# Patient Record
Sex: MALE | Employment: UNEMPLOYED | URBAN - METROPOLITAN AREA
[De-identification: names, ages, dates, MRNs, and addresses within clinical notes are randomized per-mention and may not be internally consistent; named-entity substitution may affect disease eponyms.]

---

## 2021-11-15 ENCOUNTER — CLINICAL SUPPORT (OUTPATIENT)
Dept: REHABILITATION | Facility: HOSPITAL | Age: 46
End: 2021-11-15
Payer: MEDICAID

## 2021-11-15 DIAGNOSIS — M25.661 DECREASED RANGE OF MOTION OF RIGHT KNEE: ICD-10-CM

## 2021-11-15 PROCEDURE — 97161 PT EVAL LOW COMPLEX 20 MIN: CPT | Mod: PO

## 2021-11-19 ENCOUNTER — CLINICAL SUPPORT (OUTPATIENT)
Dept: REHABILITATION | Facility: HOSPITAL | Age: 46
End: 2021-11-19
Payer: MEDICAID

## 2021-11-19 DIAGNOSIS — M25.661 DECREASED RANGE OF MOTION OF RIGHT KNEE: ICD-10-CM

## 2021-11-19 PROCEDURE — 97110 THERAPEUTIC EXERCISES: CPT | Mod: PO,CQ

## 2021-11-22 ENCOUNTER — CLINICAL SUPPORT (OUTPATIENT)
Dept: REHABILITATION | Facility: HOSPITAL | Age: 46
End: 2021-11-22
Payer: MEDICAID

## 2021-11-22 DIAGNOSIS — M25.661 DECREASED RANGE OF MOTION OF RIGHT KNEE: ICD-10-CM

## 2021-11-22 PROCEDURE — 97110 THERAPEUTIC EXERCISES: CPT | Mod: PO,CQ

## 2021-11-26 ENCOUNTER — CLINICAL SUPPORT (OUTPATIENT)
Dept: REHABILITATION | Facility: HOSPITAL | Age: 46
End: 2021-11-26
Payer: MEDICAID

## 2021-11-26 DIAGNOSIS — M25.661 DECREASED RANGE OF MOTION OF RIGHT KNEE: ICD-10-CM

## 2021-11-26 PROCEDURE — 97110 THERAPEUTIC EXERCISES: CPT | Mod: PO,CQ

## 2021-11-26 PROCEDURE — 97112 NEUROMUSCULAR REEDUCATION: CPT | Mod: PO,CQ

## 2021-11-30 ENCOUNTER — CLINICAL SUPPORT (OUTPATIENT)
Dept: REHABILITATION | Facility: HOSPITAL | Age: 46
End: 2021-11-30
Payer: MEDICAID

## 2021-11-30 DIAGNOSIS — M25.661 DECREASED RANGE OF MOTION OF RIGHT KNEE: ICD-10-CM

## 2021-11-30 PROCEDURE — 97110 THERAPEUTIC EXERCISES: CPT | Mod: PO

## 2021-12-03 ENCOUNTER — CLINICAL SUPPORT (OUTPATIENT)
Dept: REHABILITATION | Facility: HOSPITAL | Age: 46
End: 2021-12-03
Payer: MEDICAID

## 2021-12-03 DIAGNOSIS — M25.661 DECREASED RANGE OF MOTION OF RIGHT KNEE: ICD-10-CM

## 2021-12-03 PROCEDURE — 97110 THERAPEUTIC EXERCISES: CPT | Mod: PO

## 2021-12-07 ENCOUNTER — CLINICAL SUPPORT (OUTPATIENT)
Dept: REHABILITATION | Facility: HOSPITAL | Age: 46
End: 2021-12-07
Payer: MEDICAID

## 2021-12-07 DIAGNOSIS — M25.661 DECREASED RANGE OF MOTION OF RIGHT KNEE: ICD-10-CM

## 2021-12-07 PROCEDURE — 97110 THERAPEUTIC EXERCISES: CPT | Mod: PO,CQ

## 2021-12-10 ENCOUNTER — CLINICAL SUPPORT (OUTPATIENT)
Dept: REHABILITATION | Facility: HOSPITAL | Age: 46
End: 2021-12-10
Payer: MEDICAID

## 2021-12-10 DIAGNOSIS — M25.661 DECREASED RANGE OF MOTION OF RIGHT KNEE: ICD-10-CM

## 2021-12-10 PROCEDURE — 97110 THERAPEUTIC EXERCISES: CPT | Mod: PO

## 2021-12-14 ENCOUNTER — CLINICAL SUPPORT (OUTPATIENT)
Dept: REHABILITATION | Facility: HOSPITAL | Age: 46
End: 2021-12-14
Payer: MEDICAID

## 2021-12-14 DIAGNOSIS — M25.661 DECREASED RANGE OF MOTION OF RIGHT KNEE: ICD-10-CM

## 2021-12-14 PROCEDURE — 97110 THERAPEUTIC EXERCISES: CPT | Mod: PO

## 2021-12-16 ENCOUNTER — DOCUMENTATION ONLY (OUTPATIENT)
Dept: REHABILITATION | Facility: HOSPITAL | Age: 46
End: 2021-12-16
Payer: MEDICAID

## 2021-12-17 ENCOUNTER — CLINICAL SUPPORT (OUTPATIENT)
Dept: REHABILITATION | Facility: HOSPITAL | Age: 46
End: 2021-12-17
Payer: MEDICAID

## 2021-12-17 DIAGNOSIS — M25.661 DECREASED RANGE OF MOTION OF RIGHT KNEE: ICD-10-CM

## 2021-12-17 PROCEDURE — 97110 THERAPEUTIC EXERCISES: CPT | Mod: PO,CQ

## 2021-12-21 ENCOUNTER — CLINICAL SUPPORT (OUTPATIENT)
Dept: REHABILITATION | Facility: HOSPITAL | Age: 46
End: 2021-12-21
Payer: MEDICAID

## 2021-12-21 DIAGNOSIS — M25.661 DECREASED RANGE OF MOTION OF RIGHT KNEE: ICD-10-CM

## 2021-12-21 PROCEDURE — 97110 THERAPEUTIC EXERCISES: CPT | Mod: PO

## 2021-12-23 ENCOUNTER — CLINICAL SUPPORT (OUTPATIENT)
Dept: REHABILITATION | Facility: HOSPITAL | Age: 46
End: 2021-12-23
Payer: MEDICAID

## 2021-12-23 DIAGNOSIS — M25.661 DECREASED RANGE OF MOTION OF RIGHT KNEE: ICD-10-CM

## 2021-12-23 PROCEDURE — 97110 THERAPEUTIC EXERCISES: CPT | Mod: PO

## 2021-12-28 ENCOUNTER — CLINICAL SUPPORT (OUTPATIENT)
Dept: REHABILITATION | Facility: HOSPITAL | Age: 46
End: 2021-12-28
Payer: MEDICAID

## 2021-12-28 DIAGNOSIS — M25.661 DECREASED RANGE OF MOTION OF RIGHT KNEE: ICD-10-CM

## 2021-12-28 PROCEDURE — 97110 THERAPEUTIC EXERCISES: CPT | Mod: PO

## 2022-01-03 ENCOUNTER — CLINICAL SUPPORT (OUTPATIENT)
Dept: REHABILITATION | Facility: HOSPITAL | Age: 47
End: 2022-01-03
Payer: MEDICAID

## 2022-01-03 DIAGNOSIS — M25.661 DECREASED RANGE OF MOTION OF RIGHT KNEE: ICD-10-CM

## 2022-01-03 PROCEDURE — 97110 THERAPEUTIC EXERCISES: CPT | Mod: PO

## 2022-01-03 NOTE — PROGRESS NOTES
"[facility]   Physical Therapy Treatment Note    Name: Tomás Ventura  Clinic Number: [id number]    Therapy Diagnosis:   Encounter Diagnosis   Name Primary?    Decreased range of motion of right knee      Physician: Gregory Fitzpatrick MD     Visit Date: 1/3/2022    Physician Orders: PT Eval and Treat   Medical Diagnosis from Referral: M17.11 (ICD-10-CM) - Osteoarthritis of right knee  Evaluation Date: 11/15/2021  Authorization Period Expiration: 02/18/2022  Plan of Care Expiration: 2/15/2022  Visit # / Visits authorized: 1/8     Time In: 112pm  Time Out: 208pm  Total Billable Time:  56 minutes  (medicaid)    Precautions: Standard and s/p R TKA on 11/11/2021    SUBJECTIVE     Pt reports: that he does feel like he is improving a little each day but he continues to notice a lot of stiffness. He is starting to walk about a mile per day and he is trying to "walk normal" again.   He was compliant with home exercise program.  Response to previous treatment: no adverse effects   Functional change: ongoing  Pain: 4/10   Location: Right knee        OBJECTIVE     No Objective measurements taken today    Treatment     Tomás received the treatments listed below:      Tomás received therapeutic exercises to develop strength, endurance and ROM for 41 minutes including:    +Upright Bike, 5 minutes, Seat 12, inability to complete full revolutions  +Treadmill Ambulation, backwards, 5 minutes, 0.6 mph  Standing TKEs with BTB, 3 x 10 5" holds  HS stretch at the edge of the mat, 4 x 30"  SLRs with 2#, 3 x 8   Prone Extension Stretch with 5# weight x 8 min  Heel slides with strap for overpressure, 2 x 10  Bridges, 3 x 8  Side lying hip abduction, 3 x 8 on R  Shuttle Leg press, bilateral 3.5  ropes, 4 minutes  Shuttle Leg press, unilateral 2 ropes, 2 minutes  Standing Eccentric Heel Raises 2 x 10 R 3# weight   Knee flexion stretch on theraball 3 x 10       Not today:  Ankle pumps with feet elevated, 2 x " "10  Bridges, 3 x 10 3" holds  Upright bike x 6 min half-revolutions   Heel prop extension stretch with towel under the ankle, 5' (2 trials), two 3# weights (above and below knee) on second trial  Seated hamstring stretch 3x30"  gastroc stretch with strap 3x30"       manual therapy techniques: Soft tissue Mobilization were applied to the: right knee for 15 minutes, including:    Inferior Patellar Mobilizations + Knee Flexion  Superior Patellar Mobilizations + Knee Extension  MFR to Hamstrings with Skin-Rolling + Muscle Bending  Anterior Tibial Glides Grade IV  Tibial E.R Mobilizations, 3x30  Posterior tibial glides with IR, grade IV,  PROM into extension    Gait training to improve functional mobility and safety for 0 minutes, including:    Patient Education and Home Exercises     Home Exercises Provided and Patient Education Provided     Education provided:   - HEP  - proper heel to toe gait     Written Home Exercises Provided: Patient instructed to cont prior HEP. Exercises were reviewed and Tomás was able to demonstrate them prior to the end of the session.  Tomás demonstrated good  understanding of the education provided. See EMR under Patient Instructions for exercises provided during therapy sessions    ASSESSMENT     Pt continues to present to PT with a bent knee. He is able to make an in session change with his knee extension to lacking about 4 degrees but this does not last to the next session. His posterior capsule stiffness seems to be the main contributor this his lack of knee extension. Functionally he is beginning to flex and extend his knee while he ambulates and he is working on this at home by walking around his neighborhood. His flexion ROM has progressed well thus far.     Tomás is progressing well towards his goals.   Pt prognosis is Good.     Pt will continue to benefit from skilled outpatient physical therapy to address the deficits listed in the problem list box on initial evaluation, " provide pt/family education and to maximize pt's level of independence in the home and community environment.     Pt's spiritual, cultural and educational needs considered and pt agreeable to plan of care and goals.     Anticipated Barriers for therapy: history of knee problems.       Goals:  Short Term Goals (6 Weeks):   1. Pt will be compliant with HEP to supplement PT in restoring pain free function. (MET)  2. Pt will report pain less than or equal to 4/10 during activity to display improved functional ability. (progressing)  3. Pt improve impaired R knee ROM to 0-95 deg to improve mobility for normal movement patterns.  (progressing)  Long Term Goals (12 Weeks):  1. Pt will improve FOTO score to </= 29% limited to decrease perceived limitation with mobility. (progressing)  2. Pt will report pain less than or equal to 1/10 during activity to display improved functional ability. (progressing)  3. Pt improve impaired R knee ROM to 0-120 deg to improve mobility for normal movement patterns. (progressing)  4. Pt will be able to ambulate independently without an AD to demonstrated improved function. (progressing)    PLAN     Continue to progress quadriceps strength and functional movement.     KAIT BANEGAS, PT

## 2022-01-07 ENCOUNTER — CLINICAL SUPPORT (OUTPATIENT)
Dept: REHABILITATION | Facility: HOSPITAL | Age: 47
End: 2022-01-07
Payer: MEDICAID

## 2022-01-07 DIAGNOSIS — M25.661 DECREASED RANGE OF MOTION OF RIGHT KNEE: ICD-10-CM

## 2022-01-07 PROCEDURE — 97110 THERAPEUTIC EXERCISES: CPT | Mod: PO

## 2022-01-07 NOTE — PROGRESS NOTES
"[facility]   Physical Therapy Treatment Note    Name: Tomás Ventura  Clinic Number: [id number]    Therapy Diagnosis:   Encounter Diagnosis   Name Primary?    Decreased range of motion of right knee      Physician: Gregory Fitzpatrick MD     Visit Date: 1/7/2022    Physician Orders: PT Eval and Treat   Medical Diagnosis from Referral: M17.11 (ICD-10-CM) - Osteoarthritis of right knee  Evaluation Date: 11/15/2021  Authorization Period Expiration: 02/18/2022  Plan of Care Expiration: 2/15/2022  Visit # / Visits authorized: 1/8     Time In: 1330, pt arrived 15 mins late today  Time Out: 1410  Total Billable Time:  40 minutes  (medicaid)    Precautions: Standard and s/p R TKA on 11/11/2021    SUBJECTIVE     Pt reports: its been a rough week, and the knee hurts a lot  He was compliant with home exercise program.  Response to previous treatment: no adverse effects   Functional change: ongoing  Pain: 4/10   Location: Right knee        OBJECTIVE   GAIT: pt w/antalgic gait, lacking heel strike and full knee ext on R LE.    Treatment     Tomás received the treatments listed below:      Tomás received therapeutic exercises to develop strength, endurance and ROM for 40 minutes including:    Upright Bike, 5 minutes xL2, Seat 12, able to complete full revolutions this date  Shuttle Leg press, bilateral 4  ropes, 2 minutes  Shuttle Leg press, unilateral 2 ropes, 2 minutes    +modified static lunges in //bars, putting knee onto 6in step w/airex on it 3x10  Slantboard 3x30  +JESIKA hip ABD 55# 3x10  SLRs with 2#, 3 x 10 done bilat  Bridges w/legs up on green SB, 3 x 10   Seated hamstring stretch 3x30"  gastroc stretch with strap 3x30"         Patient Education and Home Exercises     Home Exercises Provided and Patient Education Provided     Education provided:   - extensive cueing given for multiple exercises as pt tends to perform things incorrectly while saying "I do these all the time" or "this is " "easy".    Written Home Exercises Provided: Patient instructed to cont prior HEP. Exercises were reviewed and Tomás was able to demonstrate them prior to the end of the session.  Tomás demonstrated good  understanding of the education provided. See EMR under Patient Instructions for exercises provided during therapy sessions    ASSESSMENT   Pt requires consistent supervision throughout session to ensure he is completing exercises properly.  Able to progress a few strengthening activities this date.      Tomás is progressing well towards his goals.   Pt prognosis is Good.     Pt will continue to benefit from skilled outpatient physical therapy to address the deficits listed in the problem list box on initial evaluation, provide pt/family education and to maximize pt's level of independence in the home and community environment.     Pt's spiritual, cultural and educational needs considered and pt agreeable to plan of care and goals.     Anticipated Barriers for therapy: history of knee problems.       Goals:  Short Term Goals (6 Weeks):   1. Pt will be compliant with HEP to supplement PT in restoring pain free function. (MET)  2. Pt will report pain less than or equal to 4/10 during activity to display improved functional ability. (progressing)  3. Pt improve impaired R knee ROM to 0-95 deg to improve mobility for normal movement patterns.  (progressing)  Long Term Goals (12 Weeks):  1. Pt will improve FOTO score to </= 29% limited to decrease perceived limitation with mobility. (progressing)  2. Pt will report pain less than or equal to 1/10 during activity to display improved functional ability. (progressing)  3. Pt improve impaired R knee ROM to 0-120 deg to improve mobility for normal movement patterns. (progressing)  4. Pt will be able to ambulate independently without an AD to demonstrated improved function. (progressing)    PLAN     Continue to progress quadriceps strength and functional movement.     Kimberly " Roscoe, PT

## 2022-01-10 ENCOUNTER — CLINICAL SUPPORT (OUTPATIENT)
Dept: REHABILITATION | Facility: HOSPITAL | Age: 47
End: 2022-01-10
Payer: MEDICAID

## 2022-01-10 DIAGNOSIS — M25.661 DECREASED RANGE OF MOTION OF RIGHT KNEE: ICD-10-CM

## 2022-01-10 PROCEDURE — 97110 THERAPEUTIC EXERCISES: CPT | Mod: PO

## 2022-01-12 NOTE — PROGRESS NOTES
[facility]   Physical Therapy Treatment Note    Name: Tomás Ventura  Clinic Number: [id number]    Therapy Diagnosis:   Encounter Diagnosis   Name Primary?    Decreased range of motion of right knee      Physician: Gregory Fitzpatrick MD     Visit Date: 1/13/2022    Physician Orders: PT Eval and Treat   Medical Diagnosis from Referral: M17.11 (ICD-10-CM) - Osteoarthritis of right knee  Evaluation Date: 11/15/2021  Authorization Period Expiration: 02/18/2022  Plan of Care Expiration: 2/15/2022  Visit # / Visits authorized: 4/8     Time In: 1016am  Time Out: 1100am  Total Billable Time:  44 minutes  (medicaid)    Precautions: Standard and s/p R TKA on 11/11/2021    SUBJECTIVE     Pt reports: that he continues to feel a little better each day. He continues to walk about 1 mile per day to work on his ability to bend his knee while he walks. He notices increased knee pain after walking about a quarter of a mile.   He was compliant with home exercise program.  Response to previous treatment: no adverse effects   Functional change: ongoing  Pain: 4/10   Location: Right knee        OBJECTIVE     Range of Motion:   Knee Left active Left Passive Right Active R passive   Flexion 130 135 104 108   Extension 0 +5 (hyper) -8 (lacking) -3 (lacking)         Function:  - Sit <--> Stand: equal weight bearing bilaterally   - Bed Mobility: independent      Joint Mobility: decreased R tibiofemoral mobility in all directions      Palpation: no TTP noted      Sensation: intact throughout R LE     Edema: minimal to no edema noted     Observation: pt ambulates into the clinic without an AD.      Gait: he ambulates with a stiff knee but is getting some knee motion throughout the gait cycle. He is unable to achieve full knee extension at initial contact.     Treatment     Tomás received the treatments listed below:      Tomás received therapeutic exercises to develop strength, endurance and ROM for 34 minutes  "including:    [place]ien NMES, 10"/10", quad sets with SLRs 6 minutes  [place] NMES, 10"/10", standing TKEs 5 minutes    Upright Bike, 5 minutes xL2, Seat 12, able to complete full revolutions this date- not today  Treadmill walking to focus on knee mobility during gait, 5'  Shuttle Leg press, bilateral 3 bottom ropes, 4 minutes  Shuttle Leg press, unilateral 2 ropes, 2 minutes  Modified lunges in //bars, putting knee onto 4in step w/airex on it 3x10  Bridges w/legs up on green SB, 3 x 10   Side lying hip abduction, 3 x 10     manual therapy techniques: Soft tissue Mobilization were applied to the: right knee for 10  minutes, including:     Assessment as above  Inferior Patellar Mobilizations + Knee Flexion  Superior Patellar Mobilizations + Knee Extension  MFR to Hamstrings with Skin-Rolling + Muscle Bending- not today  Tibial E.R Mobilizations, 3x30  Posterior tibial glides with IR, grade IV,  PROM into extension    Patient Education and Home Exercises     Home Exercises Provided and Patient Education Provided     Education provided:   - HEP  - proper heel to toe gait     Written Home Exercises Provided: Patient instructed to cont prior HEP. Exercises were reviewed and Tomás was able to demonstrate them prior to the end of the session.  Tomás demonstrated good  understanding of the education provided. See EMR under Patient Instructions for exercises provided during therapy sessions    ASSESSMENT     Upon assessment the patient is demonstrating slightly improved knee flexion and extension ROM but continues to be limited in both. This seems to be related to both capsular stiffness and muscle tightness. Due to his lack of knee extension his quad control is also limited but has progressed well over the past few weeks. He continues to ambulate with a stiff knee gait despite VC. He was placed on the treadmill today to work on his gait mechanics. Overall the patient has been seen for 17 visits s/p a R TKA. While he has " progressed well since his initial evaluation he continues with a mobility and strength deficits that is affecting his functional ability.     Tomás is progressing well towards his goals.   Pt prognosis is Good.     Pt will continue to benefit from skilled outpatient physical therapy to address the deficits listed in the problem list box on initial evaluation, provide pt/family education and to maximize pt's level of independence in the home and community environment.     Pt's spiritual, cultural and educational needs considered and pt agreeable to plan of care and goals.     Anticipated Barriers for therapy: history of knee problems.       Goals:  Short Term Goals (6 Weeks):   1. Pt will be compliant with HEP to supplement PT in restoring pain free function. (MET)  2. Pt will report pain less than or equal to 4/10 during activity to display improved functional ability. (progressing)  3. Pt improve impaired R knee ROM to 0-95 deg to improve mobility for normal movement patterns.  (progressing)  Long Term Goals (12 Weeks):  1. Pt will improve FOTO score to </= 29% limited to decrease perceived limitation with mobility. (progressing)  2. Pt will report pain less than or equal to 1/10 during activity to display improved functional ability. (progressing)  3. Pt improve impaired R knee ROM to 0-120 deg to improve mobility for normal movement patterns. (progressing)  4. Pt will be able to ambulate independently without an AD to demonstrated improved function. (progressing)    PLAN     Continue to progress quadriceps strength and functional movement.     KAIT BANEGAS, PT

## 2022-01-13 ENCOUNTER — CLINICAL SUPPORT (OUTPATIENT)
Dept: REHABILITATION | Facility: HOSPITAL | Age: 47
End: 2022-01-13
Payer: MEDICAID

## 2022-01-13 DIAGNOSIS — M25.661 DECREASED RANGE OF MOTION OF RIGHT KNEE: ICD-10-CM

## 2022-01-13 PROCEDURE — 97110 THERAPEUTIC EXERCISES: CPT | Mod: PO

## 2022-01-18 ENCOUNTER — CLINICAL SUPPORT (OUTPATIENT)
Dept: REHABILITATION | Facility: HOSPITAL | Age: 47
End: 2022-01-18
Payer: MEDICAID

## 2022-01-18 DIAGNOSIS — M25.661 DECREASED RANGE OF MOTION OF RIGHT KNEE: ICD-10-CM

## 2022-01-18 PROCEDURE — 97110 THERAPEUTIC EXERCISES: CPT | Mod: PO

## 2022-01-18 NOTE — PROGRESS NOTES
"[facility]   Physical Therapy Treatment Note    Name: Tomás Ventura  Clinic Number: [id number]    Therapy Diagnosis:   Encounter Diagnosis   Name Primary?    Decreased range of motion of right knee      Physician: Gregory Fitzpatrick MD     Visit Date: 1/18/2022    Physician Orders: PT Eval and Treat   Medical Diagnosis from Referral: M17.11 (ICD-10-CM) - Osteoarthritis of right knee  Evaluation Date: 11/15/2021  Authorization Period Expiration: 02/18/2022  Plan of Care Expiration: 2/15/2022  Visit # / Visits authorized: 5/8     Time In: 12;00 pm  Time Out: 12:45 pm  Total Billable Time:  44 minutes  (Medicaid)  Precautions: Standard and s/p R TKA on 11/11/2021  SUBJECTIVE     Pt reports: he was fitted for two DynaSplints in his home on Saturday (one for flexion and one for extension) which he has been instructed to wear at least 2x per day for 30-40 minutes at each time, which he has already completed 1x today prior to treatment and is sore from this.  He was compliant with home exercise program.  Response to previous treatment: no adverse effects   Functional change: ongoing  Pain: 5/10   Location: Right knee    OBJECTIVE     Range of Motion:   Knee Left active Left Passive Right Active R passive   Flexion 130 135 105 108   Extension 0 +5 (hyper) -5 (lacking) -3 (lacking)     Treatment     Tomás received the treatments listed below:      Tomás received therapeutic exercises to develop strength, endurance and ROM for 34 minutes including:  [place] NMES, 10"/10", Quad Sets with SLRs, 6 minutes, 38 Lu  [place] NMES, 10"/10", Standing TKEs 2 minutes  [place] NMES, 10"/10", Forward Step-Up, 6" step 2 minutes  Forward Step-Downs, 4" Step, 2x10, unilateral hand hold on ski-pole  Shuttle Leg press, Unilateral, 2 ropes, 2 minutes  +Shuttle PGM Unilateral Leg Press, 2 minutes  Treadmill Walking to focus on knee mobility during gait, 5' at 2.4 mph    Not today:  Shuttle Leg press, bilateral 3 " bottom ropes, 4 minutes- Not today  Modified lunges in //bars, putting knee onto 4in step w/airex on it 3x10- Not today.  Bridges w/legs up on green SB, 3 x 10 -Not today  Side lying hip abduction, 3 x 10-Not today    manual therapy techniques: Soft tissue Mobilization were applied to the: right knee for 10  minutes, including:  Inferior Patellar Mobilizations + Knee Flexion  Superior Patellar Mobilizations + Knee Extension  MFR to Hamstrings with Skin-Rolling + Muscle Bending- Not today  Tibial E.R Mobilizations, 3x30 Not today  Posterior tibial glides with IR, grade IV Not today  PROM into extension Not today  Patient Education and Home Exercises     Home Exercises Provided and Patient Education Provided     Education provided:   - HEP  - proper heel to toe gait     Written Home Exercises Provided: Patient instructed to cont prior HEP. Exercises were reviewed and Tomás was able to demonstrate them prior to the end of the session.  Tomás demonstrated good  understanding of the education provided. See EMR under Patient Instructions for exercises provided during therapy sessions  ASSESSMENT   Patient demonstrates slight improvement in knee extension range of motion at the beginning of treatment, as per measures above. Continues to benefit from moderate VC for increased knee range of motion during ambulation and proper gait pattern. Good quadriceps contraction prior to initiating NMES, with continued limitation in step-down due to lack of range of motion and eccentric quadriceps control.    Tomás is progressing well towards his goals.   Pt prognosis is Good.     Pt will continue to benefit from skilled outpatient physical therapy to address the deficits listed in the problem list box on initial evaluation, provide pt/family education and to maximize pt's level of independence in the home and community environment.     Pt's spiritual, cultural and educational needs considered and pt agreeable to plan of care and  goals.     Anticipated Barriers for therapy: history of knee problems.       Goals:  Short Term Goals (6 Weeks):   1. Pt will be compliant with HEP to supplement PT in restoring pain free function. (MET)  2. Pt will report pain less than or equal to 4/10 during activity to display improved functional ability. (progressing)  3. Pt improve impaired R knee ROM to 0-95 deg to improve mobility for normal movement patterns.  (progressing)  Long Term Goals (12 Weeks):  1. Pt will improve FOTO score to </= 29% limited to decrease perceived limitation with mobility. (progressing)  2. Pt will report pain less than or equal to 1/10 during activity to display improved functional ability. (progressing)  3. Pt improve impaired R knee ROM to 0-120 deg to improve mobility for normal movement patterns. (progressing)  4. Pt will be able to ambulate independently without an AD to demonstrated improved function. (progressing)    PLAN     Continue to progress quadriceps strength and functional movement.     Ann Peter, PT, DPT, OCS

## 2022-01-24 ENCOUNTER — CLINICAL SUPPORT (OUTPATIENT)
Dept: REHABILITATION | Facility: HOSPITAL | Age: 47
End: 2022-01-24
Payer: MEDICAID

## 2022-01-24 DIAGNOSIS — M25.661 DECREASED RANGE OF MOTION OF RIGHT KNEE: ICD-10-CM

## 2022-01-24 PROCEDURE — 97110 THERAPEUTIC EXERCISES: CPT | Mod: PO

## 2022-01-24 NOTE — PROGRESS NOTES
"[facility]   Physical Therapy Treatment Note    Name: Tomás Ventura  Clinic Number: [id number]    Therapy Diagnosis:   Encounter Diagnosis   Name Primary?    Decreased range of motion of right knee      Physician: Gregory Fitzpatrick MD     Visit Date: 1/24/2022    Physician Orders: PT Eval and Treat   Medical Diagnosis from Referral: M17.11 (ICD-10-CM) - Osteoarthritis of right knee  Evaluation Date: 11/15/2021  Authorization Period Expiration: 02/18/2022  Plan of Care Expiration: 2/15/2022  Visit # / Visits authorized: 6/8     Time In: 959am  Time Out: 1045am  Total Billable Time:  45 minutes  (Medicaid)    Precautions: Standard and s/p R TKA on 11/11/2021    SUBJECTIVE     Pt reports: he continues to use his dynaplint at home 3-4 times per day for about 30 minutes, then his pain is too much. He notices some improvement in his ROM but feels like it is still limited.   He was compliant with home exercise program.  Response to previous treatment: no adverse effects   Functional change: ongoing    Pain: 4/10   Location: Right knee      OBJECTIVE     No objective measures taken today    Treatment     Tomás received the treatments listed below:      Tomás received therapeutic exercises to develop strength, endurance and ROM for 35 minutes including:    Prone hangs with 7#, 5 minutes  SLR with 2# weight, 3 x 10  Bridges, 3 x 10  Step up on 4" step with BTB for TKEs, 3 x 10  Shuttle leg press, up on 2 and down on 1, 100#, 3 x 8  Shuttle Leg press, Unilateral, 2 ropes, 2 minutes  L side lying leg press with R LE only, 50#, 3 x 9  Upright bike, seat at level 7 with 6 resistance, 5 minutes (for knee ROM)    Not today:  Forward Step-Downs, 4" Step, 2x10, unilateral hand hold on ski-pole  Russian NMES, 10"/10", Quad Sets with SLRs, 6 minutes, 38 Lu  Cambodian NMES, 10"/10", Standing TKEs 2 minutes  [place]ambodian NMES, 10"/10", Forward Step-Up, 6" step 2 minutes  Shuttle Leg press, bilateral 3 bottom " ropes, 4 minutes- Not today  Modified lunges in //bars, putting knee onto 4in step w/airex on it 3x10- Not today.  Bridges w/legs up on green SB, 3 x 10 -Not today  Side lying hip abduction, 3 x 10-Not today  Treadmill Walking to focus on knee mobility during gait, 5' at 2.4 mph    manual therapy techniques: Soft tissue Mobilization were applied to the: right knee for 10  minutes, including:  Inferior Patellar Mobilizations + Knee Flexion  Superior Patellar Mobilizations + Knee Extension  MFR to Hamstrings with Skin-Rolling + Muscle Bending- Not today  Tibial E.R Mobilizations, 3x30  Posterior tibial glides with IR, grade IV Not today  PROM into extension  Patient Education and Home Exercises     Home Exercises Provided and Patient Education Provided     Education provided:   - HEP  - proper heel to toe gait     Written Home Exercises Provided: Patient instructed to cont prior HEP. Exercises were reviewed and Tomás was able to demonstrate them prior to the end of the session.  Tomás demonstrated good  understanding of the education provided. See EMR under Patient Instructions for exercises provided during therapy sessions  ASSESSMENT     Despite patient's recent improvement in his knee extension ROM he continues with significant deficits. He has been using his DynaSplint at home as prescribed which seems to be contributing to these recent improvements. Patellar and tibiofemoral joint mobility continues to be limited in all directions and is being addressed manually. He lacks quad eccentric control and displays a significant L hip drop. Eccentric quad control and closed chain hip strength were regressed to the shuttle today.     Tomás is progressing well towards his goals.   Pt prognosis is Good.     Pt will continue to benefit from skilled outpatient physical therapy to address the deficits listed in the problem list box on initial evaluation, provide pt/family education and to maximize pt's level of independence  in the home and community environment.     Pt's spiritual, cultural and educational needs considered and pt agreeable to plan of care and goals.     Anticipated Barriers for therapy: history of knee problems.       Goals:  Short Term Goals (6 Weeks):   1. Pt will be compliant with HEP to supplement PT in restoring pain free function. (MET)  2. Pt will report pain less than or equal to 4/10 during activity to display improved functional ability. (MET)  3. Pt improve impaired R knee ROM to 0-95 deg to improve mobility for normal movement patterns.  (progressing)  Long Term Goals (12 Weeks):  1. Pt will improve FOTO score to </= 29% limited to decrease perceived limitation with mobility. (progressing)  2. Pt will report pain less than or equal to 1/10 during activity to display improved functional ability. (progressing)  3. Pt improve impaired R knee ROM to 0-120 deg to improve mobility for normal movement patterns. (progressing)  4. Pt will be able to ambulate independently without an AD to demonstrated improved function. (progressing)    PLAN     Continue to progress quadriceps strength and functional movement.     KAIT BANEGAS, PT

## 2022-02-01 NOTE — PROGRESS NOTES
"[facility]   Physical Therapy Treatment Note    Name: Tomás Ventura  Clinic Number: [id number]    Therapy Diagnosis:   Encounter Diagnosis   Name Primary?    Decreased range of motion of right knee      Physician: Gregory Fitzpatrick MD     Visit Date: 2/2/2022    Physician Orders: PT Eval and Treat   Medical Diagnosis from Referral: M17.11 (ICD-10-CM) - Osteoarthritis of right knee  Evaluation Date: 11/15/2021  Authorization Period Expiration: 02/18/2022  Plan of Care Expiration: 2/15/2022  Visit # / Visits authorized: 7/8     Time In: 1220pm  Time Out: 104pm  Total Billable Time:  44 minutes  (Medicaid)    Precautions: Standard and s/p R TKA on 11/11/2021    SUBJECTIVE     Pt reports: that he has noticed that his DynaSplint has gotten easier to tolerated while it was on level 6 so he increased the resistance to level 7 recently. He feels like his ROM is steadily improving. He tried jogging over the weekend but couldn't go very far.  He was compliant with home exercise program.  Response to previous treatment: no adverse effects   Functional change: ongoing    Pain: 4/10   Location: Right knee      OBJECTIVE     No objective measures taken today    Treatment     Tomás received the treatments listed below:      Tomás received therapeutic exercises to develop strength, endurance and ROM for 35 minutes including:    Prone hangs with 7#, 5 minutes  SLR with 2# weight, 3 x 10  Bridges with BTB around knees, 2 x 12  Step up on 8" step, 3 x 10  Step downs on 2" step, 2 x 10  Shuttle leg press, up on 2 and down on 1, 100#, 3 x 8  Shuttle Leg press, Unilateral, 2 ropes, 2 minutes  L side lying leg press with R LE only, 50#, 3 x 9  Upright bike, seat at level 7 with 6 resistance, 5 minutes (for knee ROM)  Heel slides with strap for over pressure, 2 x 10 5" holds  SL clamshells with BTB, 2 x 12  Hip hikes at the wall with ball, 15x 3" on R side only      Not today:  Forward Step-Downs, 4" Step, " "2x10, unilateral hand hold on ski-pole  Russian NMES, 10"/10", Quad Sets with SLRs, 6 minutes, 38 Lu  Bermudian NMES, 10"/10", Standing TKEs 2 minutes  [place]ermudian NMES, 10"/10", Forward Step-Up, 6" step 2 minutes  Shuttle Leg press, bilateral 3 bottom ropes, 4 minutes- Not today  Modified lunges in //bars, putting knee onto 4in step w/airex on it 3x10- Not today.  Bridges w/legs up on green SB, 3 x 10 -Not today  Side lying hip abduction, 3 x 10-Not today  Treadmill Walking to focus on knee mobility during gait, 5' at 2.4 mph    manual therapy techniques: Soft tissue Mobilization were applied to the: right knee for 10  minutes, including:  Inferior Patellar Mobilizations + Knee Flexion  Superior Patellar Mobilizations + Knee Extension  MFR to Hamstrings with Skin-Rolling + Muscle Bending- Not today  Tibial I.R Mobilizations, 3x30  Posterior tibial glides with IR, grade IV  PROM into extension    Patient Education and Home Exercises     Home Exercises Provided and Patient Education Provided     Education provided:   - HEP  - proper heel to toe gait     Written Home Exercises Provided: Patient instructed to cont prior HEP. Exercises were reviewed and Tomás was able to demonstrate them prior to the end of the session.  Tomás demonstrated good  understanding of the education provided. See EMR under Patient Instructions for exercises provided during therapy sessions  ASSESSMENT     Pt presents today with continuing improvements in his knee extension ROM from his recent DynaSplint use. He does have a deficit in knee flexion as well that was worked on a lot today. He started with 99 degrees of knee flexion and following manual interventions and exercises he was able to achieve 107 degrees. Quad and hip strengthening continues as well to continue the overall function of his R LE. Eccentric quad activation was added today which challenged hi.     Tomás is progressing well towards his goals.   Pt prognosis is Good.     Pt " will continue to benefit from skilled outpatient physical therapy to address the deficits listed in the problem list box on initial evaluation, provide pt/family education and to maximize pt's level of independence in the home and community environment.     Pt's spiritual, cultural and educational needs considered and pt agreeable to plan of care and goals.     Anticipated Barriers for therapy: history of knee problems.       Goals:  Short Term Goals (6 Weeks):   1. Pt will be compliant with HEP to supplement PT in restoring pain free function. (MET)  2. Pt will report pain less than or equal to 4/10 during activity to display improved functional ability. (MET)  3. Pt improve impaired R knee ROM to 0-95 deg to improve mobility for normal movement patterns.  (progressing)  Long Term Goals (12 Weeks):  1. Pt will improve FOTO score to </= 29% limited to decrease perceived limitation with mobility. (progressing)  2. Pt will report pain less than or equal to 1/10 during activity to display improved functional ability. (progressing)  3. Pt improve impaired R knee ROM to 0-120 deg to improve mobility for normal movement patterns. (progressing)  4. Pt will be able to ambulate independently without an AD to demonstrated improved function. (progressing)    PLAN     Continue to progress quadriceps strength and functional movement.     KAIT BANEGAS, PT

## 2022-02-02 ENCOUNTER — CLINICAL SUPPORT (OUTPATIENT)
Dept: REHABILITATION | Facility: HOSPITAL | Age: 47
End: 2022-02-02
Payer: MEDICAID

## 2022-02-02 DIAGNOSIS — M25.661 DECREASED RANGE OF MOTION OF RIGHT KNEE: ICD-10-CM

## 2022-02-02 PROCEDURE — 97110 THERAPEUTIC EXERCISES: CPT | Mod: PO

## 2022-02-08 NOTE — PROGRESS NOTES
[facility]   Physical Therapy Treatment Note    Name: Tomás Ventura  Clinic Number: [id number]    Therapy Diagnosis:   Encounter Diagnosis   Name Primary?    Decreased range of motion of right knee      Physician: Gregory Fitzpatrick MD     Visit Date: 2/9/2022    Physician Orders: PT Eval and Treat   Medical Diagnosis from Referral: M17.11 (ICD-10-CM) - Osteoarthritis of right knee  Evaluation Date: 11/15/2021  Authorization Period Expiration: 02/18/2022  Updated Plan of Care Expiration: 3/31/2022  Visit # / Visits authorized: 8/8     Time In: 1216pm  Time Out: 102pm  Total Billable Time:  45 minutes     Precautions: Standard and s/p R TKA on 11/11/2021    SUBJECTIVE     Pt reports: that he has remained consistent with his flexion and extension dynasplints and feels that his ROM has improved and has become less painful. He is walking up to 2 miles per day and is even beginning to jog at times during his walks.   He was compliant with home exercise program.  Response to previous treatment: no adverse effects   Functional change: ongoing    Pain: 4/10   Location: Right knee      Prior Level of Function: severe pain or difficulty with ADLs or ambulation  Current Level of Function: moderate to minimal pain and difficulty with ADLs and ambulation     OBJECTIVE     Range of Motion:   Knee Left active Left Passive Right Active R passive   Flexion 130 135 106 110 (posterior knee pain)   Extension 0 +5 (hyper) -5 (lacking) -1 (lacking)         Function:  - Sit <--> Stand: equal weight bearing bilaterally   - Bed Mobility: independent      Joint Mobility: decreased R tibiofemoral mobility in all directions      Palpation: no TTP noted      Sensation: intact throughout R LE     Edema: no edema noted     Observation: pt ambulates into the clinic without an AD.      Gait: he ambulates with a stiff knee but is getting some knee motion throughout the gait cycle. He is unable to achieve full knee  "extension at initial contact.         Treatment     Tomás received the treatments listed below:      Tomás received therapeutic exercises to develop strength, endurance and ROM for 35 minutes including:    SLR with 3# weight, 3 x 10  Bridges with BTB around knees, 2 x 12  Step up on 8" step, 3 x 10  Step downs on 2" step, 2 x 10  Shuttle Leg press, DL , 4 ropes, 3 x 12  L side lying leg press with R LE only, 50#, 3 x 9  Upright bike, seat at level 7 with 6 resistance, 5 minutes (for knee ROM)  SL clamshells with BTB, 2 x 12  SL heel raises on the R, 3 x 10  Prone HS curls 3#, 3 x 8  LAQs on machine, no weight, 3 x 8  Squats holding 25# weight, 3 x 10    Not today:  Forward Step-Downs, 4" Step, 2x10, unilateral hand hold on ski-pole  Russian NMES, 10"/10", Quad Sets with SLRs, 6 minutes, 38 Lu  Bruneian NMES, 10"/10", Standing TKEs 2 minutes  Br[place]ian NMES, 10"/10", Forward Step-Up, 6" step 2 minutes  Shuttle Leg press, bilateral 3 bottom ropes, 4 minutes- Not today  Modified lunges in //bars, putting knee onto 4in step w/airex on it 3x10- Not today.  Bridges w/legs up on green SB, 3 x 10 -Not today  Side lying hip abduction, 3 x 10-Not today  Treadmill Walking to focus on knee mobility during gait, 5' at 2.4 mph  Hip hikes at the wall with ball, 15x 3" on R side only    manual therapy techniques: Soft tissue Mobilization were applied to the: right knee for 10  minutes, including:    Inferior Patellar Mobilizations + Knee Flexion  Superior Patellar Mobilizations + Knee Extension  MFR to Hamstrings with Skin-Rolling + Muscle Bending- Not today  Tibial I.R Mobilizations, 3x30  Posterior tibial glides with IR, grade IV  PROM into extension    Patient Education and Home Exercises     Home Exercises Provided and Patient Education Provided     Education provided:   - HEP  - proper heel to toe gait     Written Home Exercises Provided: Patient instructed to cont prior HEP. Exercises were reviewed and Tomás was able to " "demonstrate them prior to the end of the session.  Tomás demonstrated good  understanding of the education provided. See EMR under Patient Instructions for exercises provided during therapy sessions  ASSESSMENT     Upon reassessment today the patient is displaying slight improvements in his flexion and extension ROM but continues to be limited in both directions. He has remained consistent with his Dynasplint which seems to be contributing to his improvements. He continues with quad, hip and hamstring weakness that is affecting his functional ability and gait pattern. He has pain with step downs and is unable to perform a proper step down on a 4" step and the motion is painful. At this time he will continue to benefit from skilled physical therapy services to address his lack of mobility, LE weakness and impaired functional ability.     Tomás is progressing well towards his goals.   Pt prognosis is Good.     Pt will continue to benefit from skilled outpatient physical therapy to address the deficits listed in the problem list box on initial evaluation, provide pt/family education and to maximize pt's level of independence in the home and community environment.     Pt's spiritual, cultural and educational needs considered and pt agreeable to plan of care and goals.     Anticipated Barriers for therapy: history of knee problems.       Goals:  Short Term Goals (6 Weeks):   1. Pt will be compliant with HEP to supplement PT in restoring pain free function. (MET)  2. Pt will report pain less than or equal to 4/10 during activity to display improved functional ability. (MET)  3. Pt improve impaired R knee ROM to 0-95 deg to improve mobility for normal movement patterns.  (progressing)    Long Term Goals (12 Weeks):  1. Pt will improve FOTO score to </= 29% limited to decrease perceived limitation with mobility. (progressing)  2. Pt will report pain less than or equal to 1/10 during activity to display improved functional " ability. (progressing)  3. Pt improve impaired R knee ROM to 0-120 deg to improve mobility for normal movement patterns. (progressing)  4. Pt will be able to ambulate independently without an AD to demonstrated improved function. (MET)    PLAN     Plan of care Certification: 2/9/2021 to 3/31/2021.     Outpatient Physical Therapy 1 times weekly for 6 weeks to include the following interventions: Gait Training, Manual Therapy, Moist Heat/ Ice, Neuromuscular Re-ed, Patient Education, Self Care, Therapeutic Activites and Therapeutic Exercise.     KAIT BANEGAS, PT

## 2022-02-09 ENCOUNTER — CLINICAL SUPPORT (OUTPATIENT)
Dept: REHABILITATION | Facility: HOSPITAL | Age: 47
End: 2022-02-09
Payer: MEDICAID

## 2022-02-09 DIAGNOSIS — M25.661 DECREASED RANGE OF MOTION OF RIGHT KNEE: ICD-10-CM

## 2022-02-09 PROCEDURE — 97110 THERAPEUTIC EXERCISES: CPT | Mod: PO

## 2022-02-23 ENCOUNTER — CLINICAL SUPPORT (OUTPATIENT)
Dept: REHABILITATION | Facility: HOSPITAL | Age: 47
End: 2022-02-23
Payer: MEDICAID

## 2022-02-23 DIAGNOSIS — M25.661 DECREASED RANGE OF MOTION OF RIGHT KNEE: Primary | ICD-10-CM

## 2022-02-23 PROCEDURE — 97110 THERAPEUTIC EXERCISES: CPT | Mod: PO,CQ

## 2022-02-23 NOTE — PROGRESS NOTES
"[facility]   Physical Therapy Treatment Note    Name: Tomás Ventura  Clinic Number: [id number]    Therapy Diagnosis:   Encounter Diagnosis   Name Primary?    Decreased range of motion of right knee Yes     Physician: Gregory Fitzpatrick MD     Visit Date: 2/23/2022    Physician Orders: PT Eval and Treat   Medical Diagnosis from Referral: M17.11 (ICD-10-CM) - Osteoarthritis of right knee  Evaluation Date: 11/15/2021  Authorization Period Expiration: 02/18/2022  Updated Plan of Care Expiration: 3/31/2022  Visit # / Visits authorized: 9/8      Time In: 9:00 am  Time Out: 9:45 am  Total Billable Time:  45 minutes     Precautions: Standard and s/p R TKA on 11/11/2021    SUBJECTIVE     Pt reports: he has been compliant with wearing extension dynasplint, but not the flexion one  He was compliant with home exercise program.  Response to previous treatment: no adverse effects   Functional change: ongoing    Pain: 4/10   Location: Right knee      Prior Level of Function: severe pain or difficulty with ADLs or ambulation  Current Level of Function: moderate to minimal pain and difficulty with ADLs and ambulation     OBJECTIVE     Range of Motion:   Knee Left active Left Passive Right Active R passive   Flexion 130 135 106 110 (posterior knee pain)   Extension 0 +5 (hyper) -6 (lacking) -1 (lacking)            Treatment     Tomás received the treatments listed below:      Tomás received therapeutic exercises to develop strength, endurance and ROM for 35 minutes including:    SLR with 3# weight, 3 x 10  Bridges with BTB around knees, 2 x 12  Step up on 8" step, 3 x 10 - NP  Step downs on 4" step, 2 x 10  Shuttle Leg press, DL , 4 ropes, 3 x 12  L side lying leg press with R LE only, 50#, 3 x 9  Upright bike, seat at level 7 with 8 resistance, 5 minutes (for knee ROM)  SL clamshells with BTB, 2 x 12  SL heel raises on the R, 3 x 10  Prone HS curls 3#, 3 x 8  LAQs on machine, no weight, 3 x 8 -NP " "  Squats holding 25# weight, 3 x 10 -NP    Not today:  Forward Step-Downs, 4" Step, 2x10, unilateral hand hold on ski-pole  Russian NMES, 10"/10", Quad Sets with SLRs, 6 minutes, 38 Lu  Japanese NMES, 10"/10", Standing TKEs 2 minutes  Japanese NMES, 10"/10", Forward Step-Up, 6" step 2 minutes  Shuttle Leg press, bilateral 3 bottom ropes, 4 minutes- Not today  Modified lunges in //bars, putting knee onto 4in step w/airex on it 3x10- Not today.  Bridges w/legs up on green SB, 3 x 10 -Not today  Side lying hip abduction, 3 x 10-Not today  Treadmill Walking to focus on knee mobility during gait, 5' at 2.4 mph  Hip hikes at the wall with ball, 15x 3" on R side only    manual therapy techniques: Soft tissue Mobilization were applied to the: right knee for 10  minutes, including:    Inferior Patellar Mobilizations + Knee Flexion  Superior Patellar Mobilizations + Knee Extension  MFR to Hamstrings with Skin-Rolling + Muscle Bending- Not today  Tibial I.R Mobilizations, 3x30  Posterior tibial glides with IR, grade IV  PROM into extension    Patient Education and Home Exercises     Home Exercises Provided and Patient Education Provided     Education provided:   - HEP  - proper heel to toe gait     Written Home Exercises Provided: Patient instructed to cont prior HEP. Exercises were reviewed and Tomás was able to demonstrate them prior to the end of the session.  Tomás demonstrated good  understanding of the education provided. See EMR under Patient Instructions for exercises provided during therapy sessions  ASSESSMENT     Patient presents with no significant changes in right knee range of motion since last visit. He reports compliance with extension dynasplint, but not flexion. He reports subjective improvement in step downs standing on RLE. He was able to complete step downs with 4 in step today with good technique, but muscle fatigue noted. Educated patient on importance of daily HEP. Continue to progress patient as " helder [place] is progressing well towards his goals.   Pt prognosis is Good.     Pt will continue to benefit from skilled outpatient physical therapy to address the deficits listed in the problem list box on initial evaluation, provide pt/family education and to maximize pt's level of independence in the home and community environment.     Pt's spiritual, cultural and educational needs considered and pt agreeable to plan of care and goals.     Anticipated Barriers for therapy: history of knee problems.       Goals:  Short Term Goals (6 Weeks):   1. Pt will be compliant with HEP to supplement PT in restoring pain free function. (MET)  2. Pt will report pain less than or equal to 4/10 during activity to display improved functional ability. (MET)  3. Pt improve impaired R knee ROM to 0-95 deg to improve mobility for normal movement patterns.  (progressing)    Long Term Goals (12 Weeks):  1. Pt will improve FOTO score to </= 29% limited to decrease perceived limitation with mobility. (progressing)  2. Pt will report pain less than or equal to 1/10 during activity to display improved functional ability. (progressing)  3. Pt improve impaired R knee ROM to 0-120 deg to improve mobility for normal movement patterns. (progressing)  4. Pt will be able to ambulate independently without an AD to demonstrated improved function. (MET)    PLAN     Plan of care Certification: 2/9/2021 to 3/31/2021.     Outpatient Physical Therapy 1 times weekly for 6 weeks to include the following interventions: Gait Training, Manual Therapy, Moist Heat/ Ice, Neuromuscular Re-ed, Patient Education, Self Care, Therapeutic Activites and Therapeutic Exercise.     Alma Funez, PTA

## 2022-03-02 ENCOUNTER — CLINICAL SUPPORT (OUTPATIENT)
Dept: REHABILITATION | Facility: HOSPITAL | Age: 47
End: 2022-03-02
Payer: MEDICAID

## 2022-03-02 DIAGNOSIS — M25.661 DECREASED RANGE OF MOTION OF RIGHT KNEE: Primary | ICD-10-CM

## 2022-03-02 PROCEDURE — 97110 THERAPEUTIC EXERCISES: CPT | Mod: PO

## 2022-03-02 NOTE — PROGRESS NOTES
[facility]/Dischage Note  Physical Therapy Treatment Note    Name: Tomás Ventura  Clinic Number: [id number]    Therapy Diagnosis:   Encounter Diagnosis   Name Primary?    Decreased range of motion of right knee Yes     Physician: Gregory Fitzpatrick MD     Visit Date: 3/2/2022    Physician Orders: PT Eval and Treat   Medical Diagnosis from Referral: M17.11 (ICD-10-CM) - Osteoarthritis of right knee  Evaluation Date: 11/15/2021  Authorization Period Expiration: 02/18/2022  Updated Plan of Care Expiration: 3/31/2022  Visit # / Visits authorized: 10/8      Time In: 957am  Time Out: 1042am  Total Billable Time:  45 minutes     Precautions: Standard and s/p R TKA on 11/11/2021    SUBJECTIVE     Pt reports: that he did a lot of walking over the past week because of tamra gras. The knee hurt afterwards but he was able to do everything he needed to. He still gets pain in the back of the knee with bending. He is using his dynasplint daily and walking dauly.   He was compliant with home exercise program.  Response to previous treatment: no adverse effects   Functional change: ongoing    Pain: 2/10   Location: Right knee      Prior Level of Function: severe pain or difficulty with ADLs or ambulation  Current Level of Function:  minimal pain and difficulty with ADLs and ambulation     OBJECTIVE     Range of Motion:   Knee Left active Left Passive Right Active R passive   Flexion 130 135 106 110 (posterior knee pain)   Extension 0 +5 (hyper) -6 (lacking) -1 (lacking)      Function:  - Sit <--> Stand: equal weight bearing bilaterally   - Bed Mobility: independent      Joint Mobility: decreased R tibiofemoral mobility in all directions      Palpation: no TTP noted      Sensation: intact throughout R LE     Edema: no edema noted     Observation: pt ambulates into the clinic without an AD.      Gait: he is displaying more knee flexion throughout the gait pattern and is ambulating with less of a stiff gait  "pattern.       Treatment     Tomás received the treatments listed below:      Tomás received therapeutic exercises to develop strength, endurance and ROM for 45 minutes including:    Upright Bike, 7 minutes and level 10 resistance  SLR with 3# weight, 3 x 10  Bridges with alt leg kick, 2 x 12  Step up on 8" step, 3 x 10 - NP  Step downs on 6" step, 2 x 10  Shuttle Leg press, DL , 4 ropes, 3 x 12  L side lying leg press with R LE only, 50#, 3 x 9  Lateral walking with GTB around ankles and 10# KB press, 4 turf laps  SL heel raises on the R, 3 x 10  Prone HS curls 3#, 3 x 8  SL balance on floor with trampoline ball toss, 3 x 15  LAQs on machine, no weight, 3 x 8    Squats holding 25# weight, 3 x 10    Not today:  Forward Step-Downs, 4" Step, 2x10, unilateral hand hold on ski-pole  Russian NMES, 10"/10", Quad Sets with SLRs, 6 minutes, 38 Lu  [place] NMES, 10"/10", Standing TKEs 2 minutes  [place] NMES, 10"/10", Forward Step-Up, 6" step 2 minutes  Shuttle Leg press, bilateral 3 bottom ropes, 4 minutes- Not today  Modified lunges in //bars, putting knee onto 4in step w/airex on it 3x10- Not today.  Bridges w/legs up on green SB, 3 x 10 -Not today  Side lying hip abduction, 3 x 10-Not today  Treadmill Walking to focus on knee mobility during gait, 5' at 2.4 mph  Hip hikes at the wall with ball, 15x 3" on R side only    manual therapy techniques: Soft tissue Mobilization were applied to the: right knee for 0  minutes, including:    Inferior Patellar Mobilizations + Knee Flexion  Superior Patellar Mobilizations + Knee Extension  MFR to Hamstrings with Skin-Rolling + Muscle Bending- Not today  Tibial I.R Mobilizations, 3x30  Posterior tibial glides with IR, grade IV  PROM into extension    Patient Education and Home Exercises     Home Exercises Provided and Patient Education Provided     Education provided:   - HEP  - proper heel to toe gait     Written Home Exercises Provided: Patient instructed to cont prior HEP. " Exercises were reviewed and Tomás was able to demonstrate them prior to the end of the session.  Tomás demonstrated good  understanding of the education provided. See EMR under Patient Instructions for exercises provided during therapy sessions  ASSESSMENT     Upon assessment today the patient is displaying similar ROM compared to his previous assessment about a month ago. He continues to use his Dynasplints at home and his ROM progress has been slow but steady since the Dynsaplints were prescribed. Functionally, he is fully independent and is walking multiple miles per day for exercise. He continues to be compliant with his HEP to improve his quad and hip strength. Given the patient's objective and functional improvements he will be discharged from skilled PT services at this time and was educated strongly and maintaining his exercises and activity level outside of treatment to further maximize his functional ability.     Tomás is progressing well towards his goals.   Pt prognosis is Good.     Pt will continue to benefit from skilled outpatient physical therapy to address the deficits listed in the problem list box on initial evaluation, provide pt/family education and to maximize pt's level of independence in the home and community environment.     Pt's spiritual, cultural and educational needs considered and pt agreeable to plan of care and goals.     Anticipated Barriers for therapy: history of knee problems.       Goals:  Short Term Goals (6 Weeks):   1. Pt will be compliant with HEP to supplement PT in restoring pain free function. (MET)  2. Pt will report pain less than or equal to 4/10 during activity to display improved functional ability. (MET)  3. Pt improve impaired R knee ROM to 0-95 deg to improve mobility for normal movement patterns.  Not MET    Long Term Goals (12 Weeks):  1. Pt will improve FOTO score to </= 29% limited to decrease perceived limitation with mobility. Not MET  2. Pt will report  pain less than or equal to 1/10 during activity to display improved functional ability. Not MET  3. Pt improve impaired R knee ROM to 0-120 deg to improve mobility for normal movement patterns. Not MET  4. Pt will be able to ambulate independently without an AD to demonstrated improved function. (MET)    PLAN     Discharge from skilled PT services    KAIT BANEGAS PT

## 2022-03-10 ENCOUNTER — PATIENT MESSAGE (OUTPATIENT)
Dept: REHABILITATION | Facility: HOSPITAL | Age: 47
End: 2022-03-10
Payer: MEDICAID

## 2022-03-15 ENCOUNTER — CLINICAL SUPPORT (OUTPATIENT)
Dept: REHABILITATION | Facility: HOSPITAL | Age: 47
End: 2022-03-15
Payer: MEDICAID

## 2022-03-15 DIAGNOSIS — M25.661 DECREASED RANGE OF MOTION OF RIGHT KNEE: ICD-10-CM

## 2022-03-15 DIAGNOSIS — R29.898 WEAKNESS OF RIGHT LOWER EXTREMITY: ICD-10-CM

## 2022-03-15 PROCEDURE — 97110 THERAPEUTIC EXERCISES: CPT | Mod: PO

## 2022-03-15 NOTE — PROGRESS NOTES
[facility] OUTPATIENT THERAPY AND WELLNESS/Updated Plan of Care  Physical Therapy Treatment Note    Name: Tomás Ventura  Clinic Number: [id number]    Therapy Diagnosis:   Encounter Diagnoses   Name Primary?    Decreased range of motion of right knee     Weakness of right lower extremity      Physician: Gregory Fitzpatrick MD     Visit Date: 3/15/2022    Physician Orders: PT Eval and Treat   Medical Diagnosis from Referral: M17.11 (ICD-10-CM) - Osteoarthritis of right knee  Evaluation Date: 11/15/2021  Authorization Period Expiration: 05/18/2022  Updated Plan of Care Expiration: 4/15/2022  Visit # / Visits authorized: 11/14     Time In: 345pm  Time Out: 415pm  Total Billable Time:  30 minutes     Precautions: Standard and s/p R TKA on 11/11/2021    SUBJECTIVE     Pt reports: that he had is manipulation earlier this morning. He is having no pain at this time because they gave him shots to deaden the nerves. The doctor told him he was able to bend his knee all the way. He is having trouble walking because he can't feel much of his leg and his can't bend his foot up.  He was compliant with home exercise program.  Response to previous treatment: no adverse effects   Functional change: ongoing    Pain: 2/10   Location: Right knee      Prior Level of Function: severe pain or difficulty with ADLs or ambulation  Current Level of Function:  Moderate pain and difficulty with ADLs and ambulation     OBJECTIVE     Range of Motion:   Knee Left active Left Passive Right Active R passive   Flexion 130 135 118 121    Extension 0 +5 (hyper) -7 (lacking) -2 (lacking)      Function:  - Sit <--> Stand: L sided weight shift  - Bed Mobility: independent      Joint Mobility: decreased R tibiofemoral mobility in all directions      Palpation: no TTP noted      Sensation: intact throughout R LE     Edema: no edema noted     Observation: pt ambulates into the clinic without an AD.      Gait: he ambulates into the clinic with a RW and has foot  "drop on his R side as a result of an injection from his manipulation today.       Treatment     Tomás received the treatments listed below:      Tomás received therapeutic exercises to develop strength, endurance and ROM for 15 minutes including:    Assessment as above  Heel slides with strap, 3 x 10  PROM into extension and flexion    Not today:  Upright Bike, 7 minutes and level 10 resistance  SLR with 3# weight, 3 x 10  Bridges with alt leg kick, 2 x 12  Step up on 8" step, 3 x 10 - NP  Step downs on 6" step, 2 x 10  Shuttle Leg press, DL , 4 ropes, 3 x 12  L side lying leg press with R LE only, 50#, 3 x 9  Lateral walking with GTB around ankles and 10# KB press, 4 turf laps  SL heel raises on the R, 3 x 10  Prone HS curls 3#, 3 x 8  SL balance on floor with trampoline ball toss, 3 x 15  LAQs on machine, no weight, 3 x 8    Squats holding 25# weight, 3 x 10  Forward Step-Downs, 4" Step, 2x10, unilateral hand hold on ski-pole  Russian NMES, 10"/10", Quad Sets with SLRs, 6 minutes, 38 Lu  [place] citizen NMES, 10"/10", Standing TKEs 2 minutes  [place] citizen NMES, 10"/10", Forward Step-Up, 6" step 2 minutes  Shuttle Leg press, bilateral 3 bottom ropes, 4 minutes- Not today  Modified lunges in //bars, putting knee onto 4in step w/airex on it 3x10- Not today.  Bridges w/legs up on green SB, 3 x 10 -Not today  Side lying hip abduction, 3 x 10-Not today  Treadmill Walking to focus on knee mobility during gait, 5' at 2.4 mph  Hip hikes at the wall with ball, 15x 3" on R side only    manual therapy techniques: Soft tissue Mobilization were applied to the: right knee for 15  minutes, including:    Inferior Patellar Mobilizations + Knee Flexion  Superior Patellar Mobilizations + Knee Extension  MFR to Hamstrings with Skin-Rolling + Muscle Bending- Not today  Tibial I.R Mobilizations, 3x30  Posterior tibial glides with IR, grade IV      Patient Education and Home Exercises     Home Exercises Provided and Patient Education Provided "     Education provided:   - HEP  - proper heel to toe gait     Written Home Exercises Provided: Patient instructed to cont prior HEP. Exercises were reviewed and Tomás was able to demonstrate them prior to the end of the session.  Tomás demonstrated good  understanding of the education provided. See EMR under Patient Instructions for exercises provided during therapy sessions  ASSESSMENT     Pt presents to PT with a few hours following a closed manipulation of his R knee about 4 months s/p a R TKA. He was given a few injections on top of general anesthesia during the manipulation. As a result he is having very little pain today and has a lot of numbness throughout his R LE. He display foot drop and no ability to activate his R anterior tibialis muscle at this time. His knee flexion was progressed to 121 degrees following his manipulation but his knee extension remains significantly limited. At this time his plan of care will be reinitiated to help progress his knee ROM and improve his overall function following his manipulation.     Tomás is progressing well towards his goals.   Pt prognosis is Good.     Pt will continue to benefit from skilled outpatient physical therapy to address the deficits listed in the problem list box on initial evaluation, provide pt/family education and to maximize pt's level of independence in the home and community environment.     Pt's spiritual, cultural and educational needs considered and pt agreeable to plan of care and goals.     Anticipated Barriers for therapy: history of knee problems.       Goals:  Short Term Goals (6 Weeks):   1. Pt will be compliant with HEP to supplement PT in restoring pain free function. (MET)  2. Pt will report pain less than or equal to 4/10 during activity to display improved functional ability. (MET)  3. Pt improve impaired R knee ROM to 0-95 deg to improve mobility for normal movement patterns.  (progressing)    Long Term Goals (12 Weeks):  1. Pt  will improve FOTO score to </= 29% limited to decrease perceived limitation with mobility. (progressing)  2. Pt will report pain less than or equal to 1/10 during activity to display improved functional ability. (progressing)  3. Pt improve impaired R knee ROM to 0-120 deg to improve mobility for normal movement patterns. (progressing)  4. Pt will be able to ambulate independently without an AD to demonstrated improved function. (MET)    PLAN     Plan of care Certification: 3/15/2022 - 4/15/2022    Outpatient Physical Therapy 2-4 times weekly for 4 weeks to include the following interventions: Gait Training, Manual Therapy, Moist Heat/ Ice, Neuromuscular Re-ed, Patient Education, Self Care, Therapeutic Activites and Therapeutic Exercise.    KAIT BANEGAS, PT

## 2022-03-15 NOTE — PLAN OF CARE
[facility]/Updated Plan of Care  Physical Therapy Treatment Note    Name: Tomás Ventura  Clinic Number: [id number]    Therapy Diagnosis:   Encounter Diagnoses   Name Primary?    Decreased range of motion of right knee     Weakness of right lower extremity      Physician: Gregory Fitzpatrick MD     Visit Date: 3/15/2022    Physician Orders: PT Eval and Treat   Medical Diagnosis from Referral: M17.11 (ICD-10-CM) - Osteoarthritis of right knee  Evaluation Date: 11/15/2021  Authorization Period Expiration: 05/18/2022  Updated Plan of Care Expiration: 4/15/2022  Visit # / Visits authorized: 11/14     Time In: 345pm  Time Out: 415pm  Total Billable Time:  30 minutes     Precautions: Standard and s/p R TKA on 11/11/2021    SUBJECTIVE     Pt reports: that he had is manipulation earlier this morning. He is having no pain at this time because they gave him shots to deaden the nerves. The doctor told him he was able to bend his knee all the way. He is having trouble walking because he can't feel much of his leg and his can't bend his foot up.  He was compliant with home exercise program.  Response to previous treatment: no adverse effects   Functional change: ongoing    Pain: 2/10   Location: Right knee      Prior Level of Function: severe pain or difficulty with ADLs or ambulation  Current Level of Function:  Moderate pain and difficulty with ADLs and ambulation     OBJECTIVE     Range of Motion:   Knee Left active Left Passive Right Active R passive   Flexion 130 135 118 121    Extension 0 +5 (hyper) -7 (lacking) -2 (lacking)      Function:  - Sit <--> Stand: L sided weight shift  - Bed Mobility: independent      Joint Mobility: decreased R tibiofemoral mobility in all directions      Palpation: no TTP noted      Sensation: intact throughout R LE     Edema: no edema noted     Observation: pt ambulates into the clinic without an AD.      Gait: he ambulates into the clinic with a RW and has foot  "drop on his R side as a result of an injection from his manipulation today.       Treatment     Tomás received the treatments listed below:      Tomás received therapeutic exercises to develop strength, endurance and ROM for 15 minutes including:    Assessment as above  Heel slides with strap, 3 x 10  PROM into extension and flexion    Not today:  Upright Bike, 7 minutes and level 10 resistance  SLR with 3# weight, 3 x 10  Bridges with alt leg kick, 2 x 12  Step up on 8" step, 3 x 10 - NP  Step downs on 6" step, 2 x 10  Shuttle Leg press, DL , 4 ropes, 3 x 12  L side lying leg press with R LE only, 50#, 3 x 9  Lateral walking with GTB around ankles and 10# KB press, 4 turf laps  SL heel raises on the R, 3 x 10  Prone HS curls 3#, 3 x 8  SL balance on floor with trampoline ball toss, 3 x 15  LAQs on machine, no weight, 3 x 8    Squats holding 25# weight, 3 x 10  Forward Step-Downs, 4" Step, 2x10, unilateral hand hold on ski-pole  Russian NMES, 10"/10", Quad Sets with SLRs, 6 minutes, 38 Lu  [place] NMES, 10"/10", Standing TKEs 2 minutes  [place] NMES, 10"/10", Forward Step-Up, 6" step 2 minutes  Shuttle Leg press, bilateral 3 bottom ropes, 4 minutes- Not today  Modified lunges in //bars, putting knee onto 4in step w/airex on it 3x10- Not today.  Bridges w/legs up on green SB, 3 x 10 -Not today  Side lying hip abduction, 3 x 10-Not today  Treadmill Walking to focus on knee mobility during gait, 5' at 2.4 mph  Hip hikes at the wall with ball, 15x 3" on R side only    manual therapy techniques: Soft tissue Mobilization were applied to the: right knee for 15  minutes, including:    Inferior Patellar Mobilizations + Knee Flexion  Superior Patellar Mobilizations + Knee Extension  MFR to Hamstrings with Skin-Rolling + Muscle Bending- Not today  Tibial I.R Mobilizations, 3x30  Posterior tibial glides with IR, grade IV      Patient Education and Home Exercises     Home Exercises Provided and Patient Education Provided "     Education provided:   - HEP  - proper heel to toe gait     Written Home Exercises Provided: Patient instructed to cont prior HEP. Exercises were reviewed and Tomás was able to demonstrate them prior to the end of the session.  Tomás demonstrated good  understanding of the education provided. See EMR under Patient Instructions for exercises provided during therapy sessions  ASSESSMENT     Pt presents to PT with a few hours following a closed manipulation of his R knee about 4 months s/p a R TKA. He was given a few injections on top of general anesthesia during the manipulation. As a result he is having very little pain today and has a lot of numbness throughout his R LE. He display foot drop and no ability to activate his R anterior tibialis muscle at this time. His knee flexion was progressed to 121 degrees following his manipulation but his knee extension remains significantly limited. At this time his plan of care will be reinitiated to help progress his knee ROM and improve his overall function following his manipulation.     Tomás is progressing well towards his goals.   Pt prognosis is Good.     Pt will continue to benefit from skilled outpatient physical therapy to address the deficits listed in the problem list box on initial evaluation, provide pt/family education and to maximize pt's level of independence in the home and community environment.     Pt's spiritual, cultural and educational needs considered and pt agreeable to plan of care and goals.     Anticipated Barriers for therapy: history of knee problems.       Goals:  Short Term Goals (6 Weeks):   1. Pt will be compliant with HEP to supplement PT in restoring pain free function. (MET)  2. Pt will report pain less than or equal to 4/10 during activity to display improved functional ability. (MET)  3. Pt improve impaired R knee ROM to 0-95 deg to improve mobility for normal movement patterns.  (progressing)    Long Term Goals (12 Weeks):  1. Pt  will improve FOTO score to </= 29% limited to decrease perceived limitation with mobility. (progressing)  2. Pt will report pain less than or equal to 1/10 during activity to display improved functional ability. (progressing)  3. Pt improve impaired R knee ROM to 0-120 deg to improve mobility for normal movement patterns. (progressing)  4. Pt will be able to ambulate independently without an AD to demonstrated improved function. (MET)    PLAN     Plan of care Certification: 3/15/2022 - 4/15/2022    Outpatient Physical Therapy 2-4 times weekly for 4 weeks to include the following interventions: Gait Training, Manual Therapy, Moist Heat/ Ice, Neuromuscular Re-ed, Patient Education, Self Care, Therapeutic Activites and Therapeutic Exercise.    KAIT BANEGAS, PT

## 2022-03-16 ENCOUNTER — CLINICAL SUPPORT (OUTPATIENT)
Dept: REHABILITATION | Facility: HOSPITAL | Age: 47
End: 2022-03-16
Payer: MEDICAID

## 2022-03-16 DIAGNOSIS — R29.898 WEAKNESS OF RIGHT LOWER EXTREMITY: ICD-10-CM

## 2022-03-16 DIAGNOSIS — M25.661 DECREASED RANGE OF MOTION OF RIGHT KNEE: Primary | ICD-10-CM

## 2022-03-16 PROCEDURE — 97110 THERAPEUTIC EXERCISES: CPT | Mod: PO,CQ

## 2022-03-16 NOTE — PROGRESS NOTES
"[facility]/Updated Plan of Care  Physical Therapy Treatment Note    Name: Tomás Ventura  Clinic Number: [id number]    Therapy Diagnosis:   Encounter Diagnoses   Name Primary?    Decreased range of motion of right knee Yes    Weakness of right lower extremity      Physician: Gregory Fitzpatrick MD     Visit Date: 3/16/2022    Physician Orders: PT Eval and Treat   Medical Diagnosis from Referral: M17.11 (ICD-10-CM) - Osteoarthritis of right knee  Evaluation Date: 11/15/2021  Authorization Period Expiration: 05/18/2022  Updated Plan of Care Expiration: 4/15/2022  Visit # / Visits authorized: 12/14      Time In: 12:45 pm  Time Out: 1:24 pm  Total Billable Time:  39 minutes     Precautions: Standard and s/p R TKA on 11/11/2021    SUBJECTIVE     Pt reports: "I still can't move my ankle"  He was compliant with home exercise program.  Response to previous treatment: no adverse effects   Functional change: ongoing    Pain: 2/10   Location: Right knee      Prior Level of Function: severe pain or difficulty with ADLs or ambulation  Current Level of Function:  Moderate pain and difficulty with ADLs and ambulation     OBJECTIVE     Range of Motion:   Knee Left active Left Passive Right Active R passive   Flexion 130 135 118 126   Extension 0 +5 (hyper) -7 (lacking) -2 (lacking)           Gait: he ambulates into the clinic with a RW and has foot drop on his R side as a result of an injection from his manipulation yesterday      Treatment     Tomás received the treatments listed below:      Tomás received therapeutic exercises to develop strength, endurance and ROM for 24 minutes including:    Measurements taken as above  Heel slides with strap, 3 x 10  PROM into extension and flexion  Heel prop in long sitting for knee extension with PROM ankle DF using strap    Not today:  Upright Bike, 7 minutes and level 10 resistance  SLR with 3# weight, 3 x 10  Bridges with alt leg kick, 2 x 12  Step up on 8" " "step, 3 x 10 - NP  Step downs on 6" step, 2 x 10  Shuttle Leg press, DL , 4 ropes, 3 x 12  L side lying leg press with R LE only, 50#, 3 x 9  Lateral walking with GTB around ankles and 10# KB press, 4 turf laps  SL heel raises on the R, 3 x 10  Prone HS curls 3#, 3 x 8  SL balance on floor with trampoline ball toss, 3 x 15  LAQs on machine, no weight, 3 x 8    Squats holding 25# weight, 3 x 10  Forward Step-Downs, 4" Step, 2x10, unilateral hand hold on ski-pole  Russian NMES, 10"/10", Quad Sets with SLRs, 6 minutes, 38 Lu  Bhutanese NMES, 10"/10", Standing TKEs 2 minutes  Bhutanese NMES, 10"/10", Forward Step-Up, 6" step 2 minutes  Shuttle Leg press, bilateral 3 bottom ropes, 4 minutes- Not today  Modified lunges in //bars, putting knee onto 4in step w/airex on it 3x10- Not today.  Bridges w/legs up on green SB, 3 x 10 -Not today  Side lying hip abduction, 3 x 10-Not today  Treadmill Walking to focus on knee mobility during gait, 5' at 2.4 mph  Hip hikes at the wall with ball, 15x 3" on R side only    manual therapy techniques: Soft tissue Mobilization were applied to the: right knee for 15  minutes, including:    Inferior Patellar Mobilizations + Knee Flexion  Superior Patellar Mobilizations + Knee Extension  MFR to Hamstrings with Skin-Rolling + Muscle Bending- Not today  Tibial I.R Mobilizations, 3x30  Posterior tibial glides with IR, grade IV      Patient Education and Home Exercises     Home Exercises Provided and Patient Education Provided     Education provided:   - HEP  - proper heel to toe gait     Written Home Exercises Provided: Patient instructed to cont prior HEP. Exercises were reviewed and Tomás was able to demonstrate them prior to the end of the session.  Tomás demonstrated good  understanding of the education provided. See EMR under Patient Instructions for exercises provided during therapy sessions  ASSESSMENT     Patient presents to treatment ambulating with rolling walker with right foot drop " noted. Patient demonstrated improved passive right knee flexion today, but no change in active or passive knee extension. Patient again had no ability to activate right anterior tibialis muscle so passive right dorsiflexion exercise was added to to maintain joint mobility. Additionally patient was unable to activate his right quadriceps muscle today, compensating with glute muscles when attempting to complete terminal knee extension. Educated patient on completing passive dorsiflexion at home as well as continued stretching into knee flexion and extension to continue to progress mobility.      Tomás is progressing well towards his goals.   Pt prognosis is Good.     Pt will continue to benefit from skilled outpatient physical therapy to address the deficits listed in the problem list box on initial evaluation, provide pt/family education and to maximize pt's level of independence in the home and community environment.     Pt's spiritual, cultural and educational needs considered and pt agreeable to plan of care and goals.     Anticipated Barriers for therapy: history of knee problems.       Goals:  Short Term Goals (6 Weeks):   1. Pt will be compliant with HEP to supplement PT in restoring pain free function. (MET)  2. Pt will report pain less than or equal to 4/10 during activity to display improved functional ability. (MET)  3. Pt improve impaired R knee ROM to 0-95 deg to improve mobility for normal movement patterns.  (progressing)    Long Term Goals (12 Weeks):  1. Pt will improve FOTO score to </= 29% limited to decrease perceived limitation with mobility. (progressing)  2. Pt will report pain less than or equal to 1/10 during activity to display improved functional ability. (progressing)  3. Pt improve impaired R knee ROM to 0-120 deg to improve mobility for normal movement patterns. (progressing)  4. Pt will be able to ambulate independently without an AD to demonstrated improved function. (MET)    PLAN      Plan of care Certification: 3/15/2022 - 4/15/2022    Outpatient Physical Therapy 2-4 times weekly for 4 weeks to include the following interventions: Gait Training, Manual Therapy, Moist Heat/ Ice, Neuromuscular Re-ed, Patient Education, Self Care, Therapeutic Activites and Therapeutic Exercise.    Alma Funez, PTA

## 2022-03-18 ENCOUNTER — CLINICAL SUPPORT (OUTPATIENT)
Dept: REHABILITATION | Facility: HOSPITAL | Age: 47
End: 2022-03-18
Payer: MEDICAID

## 2022-03-18 DIAGNOSIS — R29.898 WEAKNESS OF RIGHT LOWER EXTREMITY: ICD-10-CM

## 2022-03-18 DIAGNOSIS — M25.661 DECREASED RANGE OF MOTION OF RIGHT KNEE: Primary | ICD-10-CM

## 2022-03-18 PROCEDURE — 97110 THERAPEUTIC EXERCISES: CPT | Mod: PO

## 2022-03-18 NOTE — PROGRESS NOTES
"[facility]/Updated Plan of Care  Physical Therapy Treatment Note    Name: Tomás Ventura  Clinic Number: [id number]    Therapy Diagnosis:   Encounter Diagnoses   Name Primary?    Decreased range of motion of right knee Yes    Weakness of right lower extremity      Physician: Gregory Fitzpatrick MD     Visit Date: 3/18/2022    Physician Orders: PT Eval and Treat   Medical Diagnosis from Referral: M17.11 (ICD-10-CM) - Osteoarthritis of right knee  Evaluation Date: 11/15/2021  Authorization Period Expiration: 05/18/2022  Updated Plan of Care Expiration: 4/15/2022  Visit # / Visits authorized: 13/14      Time In: 8:30 am  Time Out: 9:15 am  Total Billable Time:  45 minutes   Precautions: Standard and s/p R TKA on 11/11/2021  SUBJECTIVE     Pt reports: "my ankle is coming back" however he reports "If I get too comfortable walking, my knee will give out." He reports that he had a near-fall but was able to catch himself while walking.  He was compliant with home exercise program.  Response to previous treatment: no adverse effects   Functional change: ongoing    Pain: 1-2/10 "minimal"  Location: Right knee      Prior Level of Function: severe pain or difficulty with ADLs or ambulation  Current Level of Function:  Moderate pain and difficulty with ADLs and ambulation   OBJECTIVE   Range of Motion:   Knee Left active Left Passive Right Active R passive   Flexion 130 135 118 130 degrees   Extension 0 +5 (hyper) -7 (lacking) -2 (lacking)     Hip Flexion: 4/5 (right); 4+/5 (left)  Quadriceps Contraction: Trace, unable to hold LAQ without maximal assistance  Tibialis Anterior (DF) Contraction: 4+/5  Cutaneous Sensation of the Femoral Nerve: Decreased in the distributions of the lateral and anterior femoral cutaneous nerves and the saphenous nerve.  Hip Adduction: 4+/5 (seated)  Diminished Patellar Reflex  Treatment     Tomás received the treatments listed below:    Tomás received therapeutic " "exercises to develop strength, endurance and ROM for 25 minutes including:  Measurements taken as above  Knee Flexion End-Range Stretch, 3 minutes  Heel Slides with Dorsiflexion at end-range, 10x5"  SAQ, AAROM concentric maximal assist, eccentric lowering with assistance, 3x15, PT providing tactile cueing to facilitate quadriceps contraction  Heel Prop in Long Sitting, knee propped on chair, for knee extension with 18#, 5 minutes    manual therapy techniques: Soft tissue Mobilization were applied to the: right knee for 15  minutes, including:  PROM into Extension and Flexion  Inferior Patellar Mobilizations + Knee Flexion  Superior Patellar Mobilizations + Knee Extension    Patient Education and Home Exercises     Home Exercises Provided and Patient Education Provided     Education provided:   - HEP  - proper heel to toe gait   Written Home Exercises Provided: Patient instructed to cont prior HEP. Exercises were reviewed and Tomás was able to demonstrate them prior to the end of the session.  Tomás demonstrated good  understanding of the education provided. See EMR under Patient Instructions for exercises provided during therapy sessions  ASSESSMENT   Tomás demonstrates improved dorsiflexion range of motion with ability to hold against resistance at 4+/5 today. His quadriceps contraction continues to remain limited with inability to perform SAQ , LAQ or contraction isometrically. He demonstrated slightly improved quadriceps contraction upon palpation within session.  Sensation of the cutaneous branches of the femoral nerve are decreased on the right compared to the left.      Tomás is progressing well towards his goals.   Pt prognosis is Good.     Pt will continue to benefit from skilled outpatient physical therapy to address the deficits listed in the problem list box on initial evaluation, provide pt/family education and to maximize pt's level of independence in the home and community environment.     Pt's " spiritual, cultural and educational needs considered and pt agreeable to plan of care and goals.     Anticipated Barriers for therapy: history of knee problems.       Goals:  Short Term Goals (6 Weeks):   1. Pt will be compliant with HEP to supplement PT in restoring pain free function. (MET)  2. Pt will report pain less than or equal to 4/10 during activity to display improved functional ability. (MET)  3. Pt improve impaired R knee ROM to 0-95 deg to improve mobility for normal movement patterns.  (progressing)    Long Term Goals (12 Weeks):  1. Pt will improve FOTO score to </= 29% limited to decrease perceived limitation with mobility. (progressing)  2. Pt will report pain less than or equal to 1/10 during activity to display improved functional ability. (progressing)  3. Pt improve impaired R knee ROM to 0-120 deg to improve mobility for normal movement patterns. (progressing)  4. Pt will be able to ambulate independently without an AD to demonstrated improved function. (MET)    PLAN     Plan of care Certification: 3/15/2022 - 4/15/2022  Outpatient Physical Therapy 2-4 times weekly for 4 weeks to include the following interventions: Gait Training, Manual Therapy, Moist Heat/ Ice, Neuromuscular Re-ed, Patient Education, Self Care, Therapeutic Activites and Therapeutic Exercise.    Ann Peter PT, DPT, OCS  License Number: [id number]

## 2022-03-21 ENCOUNTER — CLINICAL SUPPORT (OUTPATIENT)
Dept: REHABILITATION | Facility: HOSPITAL | Age: 47
End: 2022-03-21
Payer: MEDICAID

## 2022-03-21 DIAGNOSIS — M25.661 DECREASED RANGE OF MOTION OF RIGHT KNEE: Primary | ICD-10-CM

## 2022-03-21 DIAGNOSIS — R29.898 WEAKNESS OF RIGHT LOWER EXTREMITY: ICD-10-CM

## 2022-03-21 PROCEDURE — 97110 THERAPEUTIC EXERCISES: CPT | Mod: PO

## 2022-03-21 NOTE — PROGRESS NOTES
"[facility]  Physical Therapy Treatment Note    Name: Tomás Ventura  Clinic Number: [id number]    Therapy Diagnosis:   Encounter Diagnoses   Name Primary?    Decreased range of motion of right knee Yes    Weakness of right lower extremity      Physician: Gregory Fitzpatrick MD     Visit Date: 3/21/2022    Physician Orders: PT Eval and Treat   Medical Diagnosis from Referral: M17.11 (ICD-10-CM) - Osteoarthritis of right knee  Evaluation Date: 11/15/2021  Authorization Period Expiration: 05/18/2022  Updated Plan of Care Expiration: 4/15/2022  Visit # / Visits authorized: 14/14      Time In: 1046am  Time Out: 1130am  Total Billable Time:  44 minutes     Precautions: Standard and s/p R TKA on 11/11/2021    SUBJECTIVE     Pt reports: that he is starting to move is ankle more throughout the day. He is still working on his knee flexion and extension daily. He walked about 2 miles yesterdaty  He was compliant with home exercise program.  Response to previous treatment: no adverse effects   Functional change: ongoing    Pain: 1-2/10 "minimal"  Location: Right knee      OBJECTIVE     No objective measures taken today    Treatment     Tomás received the treatments listed below:    Tomás received therapeutic exercises to develop strength, endurance and ROM for 32 minutes including:    DL shuttle leg press, 75#, 4 minutes  SL shuttle leg press, 37#, 3 minutes  Seated edge of mat, LAQ isometrics with gait belt tied around the ankle, 3 x 10 5" holds  Heel Slides with Dorsiflexion at end-range, 2 x 10 5" holds  SAQ, AAROM concentric maximal assist, eccentric lowering with assistance, 3x15, PT providing tactile cueing to facilitate quadriceps contraction  Heel Prop in Long Sitting, knee propped on chair, for knee extension with 18#, 6 minutes  Standing TKEs with GTB around the knee, 3 x 10  Knee Flexion End-Range Stretch, 3 minutes (not today)    manual therapy techniques: Soft tissue Mobilization were " applied to the: right knee for 12  minutes, including:    PROM into Extension and Flexion  Inferior Patellar Mobilizations + Knee Flexion  Superior Patellar Mobilizations + Knee Extension    Patient Education and Home Exercises     Home Exercises Provided and Patient Education Provided     Education provided:   - HEP  - proper heel to toe gait   Written Home Exercises Provided: Patient instructed to cont prior HEP. Exercises were reviewed and Tomás was able to demonstrate them prior to the end of the session.  Tomás demonstrated good  understanding of the education provided. See EMR under Patient Instructions for exercises provided during therapy sessions  ASSESSMENT     Pt continues with improved knee flexion ROM since his manipulation but is still significantly limited with his extension ROM that has not progressed recently. His ability to dorsiflex his R foot has returned but his quadriceps activation is limited and it seems to be due to the femoral nerve block. His unable to approach full knee extension during LAQs and SAQs. He continues to be educated on the importance of his HEP to improve knee ROM. He was instructed to add quad activation exercises to his HEP as well.     Tomás is progressing well towards his goals.   Pt prognosis is Good.     Pt will continue to benefit from skilled outpatient physical therapy to address the deficits listed in the problem list box on initial evaluation, provide pt/family education and to maximize pt's level of independence in the home and community environment.     Pt's spiritual, cultural and educational needs considered and pt agreeable to plan of care and goals.     Anticipated Barriers for therapy: history of knee problems.       Goals:  Short Term Goals (6 Weeks):   1. Pt will be compliant with HEP to supplement PT in restoring pain free function. (MET)  2. Pt will report pain less than or equal to 4/10 during activity to display improved functional ability.  (MET)  3. Pt improve impaired R knee ROM to 0-95 deg to improve mobility for normal movement patterns.  (progressing)    Long Term Goals (12 Weeks):  1. Pt will improve FOTO score to </= 29% limited to decrease perceived limitation with mobility. (progressing)  2. Pt will report pain less than or equal to 1/10 during activity to display improved functional ability. (progressing)  3. Pt improve impaired R knee ROM to 0-120 deg to improve mobility for normal movement patterns. (progressing)  4. Pt will be able to ambulate independently without an AD to demonstrated improved function. (MET)    PLAN     Plan of care Certification: 3/15/2022 - 4/15/2022    Outpatient Physical Therapy 2-4 times weekly for 4 weeks to include the following interventions: Gait Training, Manual Therapy, Moist Heat/ Ice, Neuromuscular Re-ed, Patient Education, Self Care, Therapeutic Activites and Therapeutic Exercise.    Twan Weller PT, DPT

## 2022-03-22 ENCOUNTER — CLINICAL SUPPORT (OUTPATIENT)
Dept: REHABILITATION | Facility: HOSPITAL | Age: 47
End: 2022-03-22
Payer: MEDICAID

## 2022-03-22 DIAGNOSIS — M25.661 DECREASED RANGE OF MOTION OF RIGHT KNEE: Primary | ICD-10-CM

## 2022-03-22 DIAGNOSIS — R29.898 WEAKNESS OF RIGHT LOWER EXTREMITY: ICD-10-CM

## 2022-03-22 PROCEDURE — 97110 THERAPEUTIC EXERCISES: CPT | Mod: PO

## 2022-03-22 NOTE — PROGRESS NOTES
"[facility]  Physical Therapy Treatment Note    Name: Tomás Ventura  Clinic Number: [id number]    Therapy Diagnosis:   Encounter Diagnoses   Name Primary?    Decreased range of motion of right knee Yes    Weakness of right lower extremity      Physician: Gregory Fitzpatrick MD     Visit Date: 3/22/2022    Physician Orders: PT Eval and Treat   Medical Diagnosis from Referral: M17.11 (ICD-10-CM) - Osteoarthritis of right knee  Evaluation Date: 11/15/2021  Authorization Period Expiration: 05/18/2022  Updated Plan of Care Expiration: 4/15/2022  Visit # / Visits authorized: 14/14 (no current active plan)     Time In: 1: 31 am  Time Out: 2:15 am  Total Billable Time:  44 minutes     Precautions: Standard and s/p R TKA on 11/11/2021    SUBJECTIVE     Pt reports: Better every day     He was compliant with home exercise program.  Response to previous treatment: no adverse effects   Functional change: ongoing    Pain: 0/10 "minimal"  Location: Right knee      OBJECTIVE     No objective measures taken today    Treatment     Tomás received the treatments listed below:      Tomás received therapeutic exercises to develop strength, endurance and ROM for 36 minutes including:    Heel Prop in Long Sitting, knee propped on chair, for knee extension with 18#, 6 minutes  SAQ - 3x10   LAQ + 3# ankle weight 2x10   Standing TKEs with GTB around the knee, 3 x 10  Heel Slides with Dorsiflexion at end-range, 2 x 10 5" holds  Leg press double leg 20# seat 8 - 3x10 (shuttle not available)     Not performed:   DL shuttle leg press, 75#, 4 minutes  SL shuttle leg press, 37#, 3 minutes  Seated edge of mat, LAQ isometrics with gait belt tied around the ankle, 3 x 10 5" holds - (not today)  Knee Flexion End-Range Stretch, 3 minutes (not today)    manual therapy techniques: Soft tissue Mobilization were applied to the: right knee for 08  minutes, including:    Overpressure into Extension  Inferior Patellar Mobilizations " + Knee Flexion - NP  Superior Patellar Mobilizations + Knee Extension    Patient Education and Home Exercises     Home Exercises Provided and Patient Education Provided     Education provided:   - HEP  - proper heel to toe gait   Written Home Exercises Provided: Patient instructed to cont prior HEP. Exercises were reviewed and Tomás was able to demonstrate them prior to the end of the session.  Tomás demonstrated good  understanding of the education provided. See EMR under Patient Instructions for exercises provided during therapy sessions  ASSESSMENT     Tolerated the session well. Able to perform full SAQ today within available ROM with no assistance as it was noted that he was not able to achieve this last visit. Appears to be progressing well since MOA.     Tomás is progressing well towards his goals.   Pt prognosis is Good.     Pt will continue to benefit from skilled outpatient physical therapy to address the deficits listed in the problem list box on initial evaluation, provide pt/family education and to maximize pt's level of independence in the home and community environment.     Pt's spiritual, cultural and educational needs considered and pt agreeable to plan of care and goals.     Anticipated Barriers for therapy: history of knee problems.       Goals:  Short Term Goals (6 Weeks):   1. Pt will be compliant with HEP to supplement PT in restoring pain free function. (MET)  2. Pt will report pain less than or equal to 4/10 during activity to display improved functional ability. (MET)  3. Pt improve impaired R knee ROM to 0-95 deg to improve mobility for normal movement patterns.  (progressing)    Long Term Goals (12 Weeks):  1. Pt will improve FOTO score to </= 29% limited to decrease perceived limitation with mobility. (progressing)  2. Pt will report pain less than or equal to 1/10 during activity to display improved functional ability. (progressing)  3. Pt improve impaired R knee ROM to 0-120 deg to  improve mobility for normal movement patterns. (progressing)  4. Pt will be able to ambulate independently without an AD to demonstrated improved function. (MET)    PLAN     Plan of care Certification: 3/15/2022 - 4/15/2022    Outpatient Physical Therapy 2-4 times weekly for 4 weeks to include the following interventions: Gait Training, Manual Therapy, Moist Heat/ Ice, Neuromuscular Re-ed, Patient Education, Self Care, Therapeutic Activites and Therapeutic Exercise.    Javier Perez PT, DPT.

## 2022-03-22 NOTE — PROGRESS NOTES
"[facility]  Physical Therapy Treatment Note    Name: Tomás Ventura  Clinic Number: [id number]    Therapy Diagnosis:   Encounter Diagnoses   Name Primary?    Decreased range of motion of right knee Yes    Weakness of right lower extremity      Physician: Gregory Fitzpatrick MD     Visit Date: 3/23/2022    Physician Orders: PT Eval and Treat   Medical Diagnosis from Referral: M17.11 (ICD-10-CM) - Osteoarthritis of right knee  Evaluation Date: 11/15/2021  Authorization Period Expiration: 05/18/2022  Updated Plan of Care Expiration: 4/15/2022  Visit # / Visits authorized: 15/14      Time In: 1045am  Time Out: 1130am  Total Billable Time:  45 minutes     Precautions: Standard and s/p R TKA on 11/11/2021    SUBJECTIVE     Pt reports: feeling good today. Feels like his knee is getting straighter and his quad is getting stronger.     He was compliant with home exercise program.  Response to previous treatment: no adverse effects   Functional change: ongoing    Pain: 0/10 "minimal"  Location: Right knee      OBJECTIVE     No objective measures taken today    Treatment     Tomás received the treatments listed below:      Tomás received therapeutic exercises to develop strength, endurance and ROM for 35 minutes including:    Heel Prop in Long Sitting, knee propped on chair, for knee extension with 20#, 6 minutes  SAQ - 3x10 3#  LAQ + 3# ankle weight 2x10   Step ups on 6" with emphasis on knee extension, 2 x 12  Heel Slides with Dorsiflexion at end-range, 2 x 10 5" holds  Leg press double leg 50# seat 8 - 3x10   SL shuttle leg press, 62#, 3 minutes  Lateral walking with GTB around ankles, 3 turf laps    Not performed:   DL shuttle leg press, 75#, 4 minutes  Seated edge of mat, LAQ isometrics with gait belt tied around the ankle, 3 x 10 5" holds - (not today)  Knee Flexion End-Range Stretch, 3 minutes (not today)    manual therapy techniques: Soft tissue Mobilization were applied to the: right knee " for 10 minutes, including:    Overpressure into Extension  Inferior Patellar Mobilizations + Knee Flexion - NP  Superior Patellar Mobilizations + Knee Extension    Patient Education and Home Exercises     Home Exercises Provided and Patient Education Provided     Education provided:   - HEP  - proper heel to toe gait     Written Home Exercises Provided: Patient instructed to cont prior HEP. Exercises were reviewed and Tomás was able to demonstrate them prior to the end of the session.  Tomás demonstrated good  understanding of the education provided. See EMR under Patient Instructions for exercises provided during therapy sessions  ASSESSMENT     Pt continues with an extension ROM deficits that has not made much progress over the past few weeks. His flexion ROM has progressed a lot since his manipulation. His quad strength is slowly returning and strengthening exercises were progressed today.     Tomás is progressing well towards his goals.   Pt prognosis is Good.     Pt will continue to benefit from skilled outpatient physical therapy to address the deficits listed in the problem list box on initial evaluation, provide pt/family education and to maximize pt's level of independence in the home and community environment.     Pt's spiritual, cultural and educational needs considered and pt agreeable to plan of care and goals.     Anticipated Barriers for therapy: history of knee problems.       Goals:  Short Term Goals (6 Weeks):   1. Pt will be compliant with HEP to supplement PT in restoring pain free function. (MET)  2. Pt will report pain less than or equal to 4/10 during activity to display improved functional ability. (MET)  3. Pt improve impaired R knee ROM to 0-95 deg to improve mobility for normal movement patterns.  (progressing)    Long Term Goals (12 Weeks):  1. Pt will improve FOTO score to </= 29% limited to decrease perceived limitation with mobility. (progressing)  2. Pt will report pain less than  or equal to 1/10 during activity to display improved functional ability. (progressing)  3. Pt improve impaired R knee ROM to 0-120 deg to improve mobility for normal movement patterns. (progressing)  4. Pt will be able to ambulate independently without an AD to demonstrated improved function. (MET)    PLAN     Plan of care Certification: 3/15/2022 - 4/15/2022    Outpatient Physical Therapy 2-4 times weekly for 4 weeks to include the following interventions: Gait Training, Manual Therapy, Moist Heat/ Ice, Neuromuscular Re-ed, Patient Education, Self Care, Therapeutic Activites and Therapeutic Exercise.    KAIT BANEGAS PT, DPT

## 2022-03-23 ENCOUNTER — CLINICAL SUPPORT (OUTPATIENT)
Dept: REHABILITATION | Facility: HOSPITAL | Age: 47
End: 2022-03-23
Payer: MEDICAID

## 2022-03-23 DIAGNOSIS — R29.898 WEAKNESS OF RIGHT LOWER EXTREMITY: ICD-10-CM

## 2022-03-23 DIAGNOSIS — M25.661 DECREASED RANGE OF MOTION OF RIGHT KNEE: Primary | ICD-10-CM

## 2022-03-23 PROCEDURE — 97110 THERAPEUTIC EXERCISES: CPT | Mod: PO

## 2022-03-24 NOTE — PROGRESS NOTES
"[facility]  Physical Therapy Treatment Note    Name: Tomás Ventura  Clinic Number: [id number]    Therapy Diagnosis:   Encounter Diagnoses   Name Primary?    Decreased range of motion of right knee Yes    Weakness of right lower extremity      Physician: Gregory Fitzpatrick MD     Visit Date: 3/25/2022    Physician Orders: PT Eval and Treat   Medical Diagnosis from Referral: M17.11 (ICD-10-CM) - Osteoarthritis of right knee  Evaluation Date: 11/15/2021  Authorization Period Expiration: 05/18/2022  Updated Plan of Care Expiration: 4/15/2022  Visit # / Visits authorized: 16/14      Time In: 720am  Time Out: 805am  Total Billable Time:  45 minutes     Precautions: Standard and s/p R TKA on 11/11/2021    SUBJECTIVE     Pt reports: that his pain is starting to return. He thought that after the manipulation the pain would be gone. He walked 3 miles yesterday and had pain later in the evening. He states that he typically works on his knee mobility a few hours few day.    He was compliant with home exercise program.  Response to previous treatment: no adverse effects   Functional change: ongoing    Pain: 0/10 "minimal"  Location: Right knee      OBJECTIVE     No objective measures taken today    Treatment     Tomás received the treatments listed below:      Tomás received therapeutic exercises to develop strength, endurance and ROM for 35 minutes including:    Heel Prop in Long Sitting, knee propped on chair, for knee extension with 20#, 6 minutes  DL shuttle leg press, 75#, 4 minutes  LAQ + 3# ankle weight 2x10   Step ups on 6" with emphasis on knee extension, 3 x 8  double leg shuttle press 100#, 3 x 10   SL shuttle leg press, 75#, 3 x 10   Lateral walking with GTB around ankles, 3 turf laps  Step downs on 6" step, 3 x 10    Not performed:   Seated edge of mat, LAQ isometrics with gait belt tied around the ankle, 3 x 10 5" holds - (not today)  Knee Flexion End-Range Stretch, 3 minutes (not " today)    manual therapy techniques: Soft tissue Mobilization were applied to the: right knee for 10 minutes, including:    Overpressure into Extension  Inferior Patellar Mobilizations + Knee Flexion   Superior Patellar Mobilizations + Knee Extension    Patient Education and Home Exercises     Home Exercises Provided and Patient Education Provided     Education provided:   - HEP  - proper heel to toe gait     Written Home Exercises Provided: Patient instructed to cont prior HEP. Exercises were reviewed and Tomás was able to demonstrate them prior to the end of the session.  Tomás demonstrated good  understanding of the education provided. See EMR under Patient Instructions for exercises provided during therapy sessions  ASSESSMENT     Pt has been having some increased pain over the past few days. His flexion was 120 degrees today with no pain. His extension remains unchanged and continues to be limited by 6 degrees. He was educated that activity is good to improve his mobility and strength but to ensure that his activity level is not further exacerbating his symptoms and working on his mobility for multiple hours per day may cause some increase in symptoms.     Tomás is progressing well towards his goals.   Pt prognosis is Good.     Pt will continue to benefit from skilled outpatient physical therapy to address the deficits listed in the problem list box on initial evaluation, provide pt/family education and to maximize pt's level of independence in the home and community environment.     Pt's spiritual, cultural and educational needs considered and pt agreeable to plan of care and goals.     Anticipated Barriers for therapy: history of knee problems.       Goals:  Short Term Goals (6 Weeks):   1. Pt will be compliant with HEP to supplement PT in restoring pain free function. (MET)  2. Pt will report pain less than or equal to 4/10 during activity to display improved functional ability. (MET)  3. Pt improve  impaired R knee ROM to 0-95 deg to improve mobility for normal movement patterns.  (progressing)    Long Term Goals (12 Weeks):  1. Pt will improve FOTO score to </= 29% limited to decrease perceived limitation with mobility. (progressing)  2. Pt will report pain less than or equal to 1/10 during activity to display improved functional ability. (progressing)  3. Pt improve impaired R knee ROM to 0-120 deg to improve mobility for normal movement patterns. (progressing)  4. Pt will be able to ambulate independently without an AD to demonstrated improved function. (MET)    PLAN     Plan of care Certification: 3/15/2022 - 4/15/2022    Outpatient Physical Therapy 2-4 times weekly for 4 weeks to include the following interventions: Gait Training, Manual Therapy, Moist Heat/ Ice, Neuromuscular Re-ed, Patient Education, Self Care, Therapeutic Activites and Therapeutic Exercise.    KAIT BANEGAS PT, DPT

## 2022-03-25 ENCOUNTER — CLINICAL SUPPORT (OUTPATIENT)
Dept: REHABILITATION | Facility: HOSPITAL | Age: 47
End: 2022-03-25
Payer: MEDICAID

## 2022-03-25 DIAGNOSIS — M25.661 DECREASED RANGE OF MOTION OF RIGHT KNEE: Primary | ICD-10-CM

## 2022-03-25 DIAGNOSIS — R29.898 WEAKNESS OF RIGHT LOWER EXTREMITY: ICD-10-CM

## 2022-03-25 PROCEDURE — 97110 THERAPEUTIC EXERCISES: CPT | Mod: PO

## 2022-03-28 ENCOUNTER — CLINICAL SUPPORT (OUTPATIENT)
Dept: REHABILITATION | Facility: HOSPITAL | Age: 47
End: 2022-03-28
Payer: MEDICAID

## 2022-03-28 DIAGNOSIS — R29.898 WEAKNESS OF RIGHT LOWER EXTREMITY: ICD-10-CM

## 2022-03-28 DIAGNOSIS — M25.661 DECREASED RANGE OF MOTION OF RIGHT KNEE: Primary | ICD-10-CM

## 2022-03-28 PROCEDURE — 97110 THERAPEUTIC EXERCISES: CPT | Mod: PO

## 2022-03-28 NOTE — PROGRESS NOTES
"[facility]  Physical Therapy Treatment Note    Name: Tomás Ventura  Clinic Number: [id number]    Therapy Diagnosis:   Encounter Diagnoses   Name Primary?    Decreased range of motion of right knee Yes    Weakness of right lower extremity      Physician: Gregory Fitzpatrick MD     Visit Date: 3/28/2022    Physician Orders: PT Eval and Treat   Medical Diagnosis from Referral: M17.11 (ICD-10-CM) - Osteoarthritis of right knee  Evaluation Date: 11/15/2021  Authorization Period Expiration: 05/18/2022  Updated Plan of Care Expiration: 4/15/2022  Visit # / Visits authorized: 16/14      Time In: 1445  Time Out: 1530  Total Billable Time:  45 minutes     Precautions: Standard and s/p R TKA on 11/11/2021    SUBJECTIVE     Pt reports: says he was able to walk 7 miles yesterday and do some yardwork.   Reports having a manipulation 2 weeks ago (~4 months post-op).     He was compliant with home exercise program.  Response to previous treatment: no adverse effects   Functional change: ongoing    Pain: 0/10 "minimal"  Location: Right knee      OBJECTIVE     R knee active flexion in supine: 126deg    Treatment     Tomás received the treatments listed below:      Tomás received therapeutic exercises to develop strength, endurance and ROM for 35 minutes including:    upright bike L3 x6mins  Slantboard 3x30  Half Kneeling on airex to floor 3x30  Double LE kneeling 3x30  Lateral heel taps using 6" with emphasis on knee extension, 3 x 10  +SL sit<>stand from elevated hi-lo table, using pole in L UE for balance assist 3x8  double leg shuttle press 100#, 3 x 10   SL shuttle leg press, 75#, 3 x 10   Leg press 60# x30    NP this visit:  Heel Prop in Long Sitting, knee propped on chair, for knee extension with 20#, 6 minutes  DL shuttle leg press, 75#, 4 minutes  LAQ + 3# ankle weight 2x10     manual therapy techniques: Soft tissue Mobilization were applied to the: right knee for 00 minutes, " including:    Overpressure into Extension  Inferior Patellar Mobilizations + Knee Flexion   Superior Patellar Mobilizations + Knee Extension    Patient Education and Home Exercises     Home Exercises Provided and Patient Education Provided     Education provided:   - HEP  - proper heel to toe gait     Written Home Exercises Provided: Patient instructed to cont prior HEP. Exercises were reviewed and Tomás was able to demonstrate them prior to the end of the session.  Tomás demonstrated good  understanding of the education provided. See EMR under Patient Instructions for exercises provided during therapy sessions  ASSESSMENT   pt still lacking some extension but overall function is improving. He reports high levels of pain but is able to perform various strengthening and stretch in PT sessions w/o issue.     Tomás is progressing well towards his goals.   Pt prognosis is Good.     Pt will continue to benefit from skilled outpatient physical therapy to address the deficits listed in the problem list box on initial evaluation, provide pt/family education and to maximize pt's level of independence in the home and community environment.     Pt's spiritual, cultural and educational needs considered and pt agreeable to plan of care and goals.     Anticipated Barriers for therapy: history of knee problems.       Goals:  Short Term Goals (6 Weeks):   1. Pt will be compliant with HEP to supplement PT in restoring pain free function. (MET)  2. Pt will report pain less than or equal to 4/10 during activity to display improved functional ability. (MET)  3. Pt improve impaired R knee ROM to 0-95 deg to improve mobility for normal movement patterns.  (progressing)    Long Term Goals (12 Weeks):  1. Pt will improve FOTO score to </= 29% limited to decrease perceived limitation with mobility. (progressing)  2. Pt will report pain less than or equal to 1/10 during activity to display improved functional ability. (progressing)  3. Pt  improve impaired R knee ROM to 0-120 deg to improve mobility for normal movement patterns. (progressing)  4. Pt will be able to ambulate independently without an AD to demonstrated improved function. (MET)    PLAN     Plan of care Certification: 3/15/2022 - 4/15/2022    Outpatient Physical Therapy 2-4 times weekly for 4 weeks to include the following interventions: Gait Training, Manual Therapy, Moist Heat/ Ice, Neuromuscular Re-ed, Patient Education, Self Care, Therapeutic Activites and Therapeutic Exercise.    Kimberly Malhotra PT, DPT

## 2022-03-29 ENCOUNTER — CLINICAL SUPPORT (OUTPATIENT)
Dept: REHABILITATION | Facility: HOSPITAL | Age: 47
End: 2022-03-29
Payer: MEDICAID

## 2022-03-29 DIAGNOSIS — M25.661 DECREASED RANGE OF MOTION OF RIGHT KNEE: Primary | ICD-10-CM

## 2022-03-29 DIAGNOSIS — R29.898 WEAKNESS OF RIGHT LOWER EXTREMITY: ICD-10-CM

## 2022-03-29 PROCEDURE — 97110 THERAPEUTIC EXERCISES: CPT | Mod: PO

## 2022-03-29 PROCEDURE — 97140 MANUAL THERAPY 1/> REGIONS: CPT | Mod: PO

## 2022-03-29 NOTE — PROGRESS NOTES
"[facility]  Physical Therapy Treatment Note    Name: Tomás Ventura  Clinic Number: [id number]    Therapy Diagnosis:   Encounter Diagnoses   Name Primary?    Decreased range of motion of right knee Yes    Weakness of right lower extremity      Physician: Gregory Fitzpatrick MD     Visit Date: 3/29/2022    Physician Orders: PT Eval and Treat   Medical Diagnosis from Referral: M17.11 (ICD-10-CM) - Osteoarthritis of right knee  Evaluation Date: 11/15/2021  Authorization Period Expiration: 05/18/2022  Updated Plan of Care Expiration: 4/15/2022  Visit # / Visits authorized: 17/14      Time In: 1015  Time Out: 1100  Total Billable Time:  45 minutes     Precautions: Standard and s/p R TKA on 11/11/2021    SUBJECTIVE     Pt reports: says he was able to walk 7 miles yesterday and do some yardwork.   Reports having a manipulation 2 weeks ago (~4 months post-op).     He was compliant with home exercise program.  Response to previous treatment: no adverse effects   Functional change: ongoing    Pain: 0/10 "minimal"  Location: Right knee      OBJECTIVE     R knee ROM in supine:  130 - 8 post Tx.  Treatment     Tomás received the treatments listed below:      Tomás received therapeutic exercises to develop strength, endurance and ROM for 20 minutes including:    upright bike L3 x6mins  Slantboard 3x30  Half Kneeling on airex to floor 3x30  Double LE kneeling 3x30  Lateral heel taps using 6" with emphasis on knee extension, 3 x 10  +SL sit<>stand from elevated hi-lo table, using pole in L UE for balance assist 3x8  double leg shuttle press 100#, 3 x 10   SL shuttle leg press, 75#, 3 x 10   Seated R knee flexion with manual resistance 10 x 3  Prone hang   Leg press 60# x30  Bridges staggared stand=ce 10 x 3  NP this visit:  Heel Prop in Long Sitting, knee propped on chair, for knee extension with 20#, 6 minutes  DL shuttle leg press, 75#, 4 minutes  LAQ + 3# ankle weight 2x10     manual therapy " techniques: Soft tissue Mobilization were applied to the: right knee for 25 minutes, including:  Tack and hold to R quad into knee flexion   IASTM to the R hamstring, calf and posterior R knee in prone  R Patella and knee passive mobilization  R patella tendon soft tissue mobilization  Inferior Patellar Mobilizations + Knee Flexion   Superior Patellar Mobilizations + Knee Extension    Patient Education and Home Exercises     Home Exercises Provided and Patient Education Provided     Education provided:   - HEP  - proper heel to toe gait     Written Home Exercises Provided: Patient instructed to cont prior HEP. Exercises were reviewed and Tomás was able to demonstrate them prior to the end of the session.  Tomás demonstrated good  understanding of the education provided. See EMR under Patient Instructions for exercises provided during therapy sessions  ASSESSMENT   Pt tolerated all Tx activities well today.  His R knee flexion has improved to 130 degrees, but his extension remains limited.  Pt began today's Tx at 15 degrees from full extension and and progressed to 8 degrees from full extension.  Continue Tx with an emphasis on gaining full R knee extension.  Tomás is progressing well towards his goals.   Pt prognosis is Good.     Pt will continue to benefit from skilled outpatient physical therapy to address the deficits listed in the problem list box on initial evaluation, provide pt/family education and to maximize pt's level of independence in the home and community environment.     Pt's spiritual, cultural and educational needs considered and pt agreeable to plan of care and goals.     Anticipated Barriers for therapy: history of knee problems.       Goals:  Short Term Goals (6 Weeks):   1. Pt will be compliant with HEP to supplement PT in restoring pain free function. (MET)  2. Pt will report pain less than or equal to 4/10 during activity to display improved functional ability. (MET)  3. Pt improve impaired  R knee ROM to 0-95 deg to improve mobility for normal movement patterns.  (progressing)    Long Term Goals (12 Weeks):  1. Pt will improve FOTO score to </= 29% limited to decrease perceived limitation with mobility. (progressing)  2. Pt will report pain less than or equal to 1/10 during activity to display improved functional ability. (progressing)  3. Pt improve impaired R knee ROM to 0-120 deg to improve mobility for normal movement patterns. (progressing)  4. Pt will be able to ambulate independently without an AD to demonstrated improved function. (MET)    PLAN     Plan of care Certification: 3/15/2022 - 4/15/2022    Outpatient Physical Therapy 2-4 times weekly for 4 weeks to include the following interventions: Gait Training, Manual Therapy, Moist Heat/ Ice, Neuromuscular Re-ed, Patient Education, Self Care, Therapeutic Activites and Therapeutic Exercise.    Prieto Ibarra PT, DPT

## 2022-03-30 ENCOUNTER — CLINICAL SUPPORT (OUTPATIENT)
Dept: REHABILITATION | Facility: HOSPITAL | Age: 47
End: 2022-03-30
Payer: MEDICAID

## 2022-03-30 DIAGNOSIS — R29.898 WEAKNESS OF RIGHT LOWER EXTREMITY: ICD-10-CM

## 2022-03-30 DIAGNOSIS — M25.661 DECREASED RANGE OF MOTION OF RIGHT KNEE: Primary | ICD-10-CM

## 2022-03-30 PROCEDURE — 97110 THERAPEUTIC EXERCISES: CPT | Mod: PO

## 2022-03-30 NOTE — PROGRESS NOTES
"[facility]  Physical Therapy Treatment Note    Name: Tomás Ventura  Clinic Number: [id number]    Therapy Diagnosis:   Encounter Diagnoses   Name Primary?    Decreased range of motion of right knee Yes    Weakness of right lower extremity      Physician: Gregory Fitzpatrick MD     Visit Date: 3/30/2022    Physician Orders: PT Eval and Treat   Medical Diagnosis from Referral: M17.11 (ICD-10-CM) - Osteoarthritis of right knee  Evaluation Date: 11/15/2021  Authorization Period Expiration: 05/18/2022  Updated Plan of Care Expiration: 4/15/2022  Visit # / Visits authorized: 18/14      Time In: 09:45 am  Time Out: 08:33 am  Total Billable Time:  48 minutes     Precautions: Standard and s/p R TKA on 11/11/2021    SUBJECTIVE     Pt reports: He continues to feel really stiff in the morning and feels like he has to do a lot of work to get his knee bending in the morning. He still can't get his knee fully straight and doesn't know if it will. He has some pain with knee bending in the posterior part of his knee. He continues to walk to try and work on his bending. He goes to see his doctor today and will see what they say about his knee.     He was compliant with home exercise program.  Response to previous treatment: Sore, no adverse effects   Functional change: Ongoing, able to walk more     Pain: 0/10 "minimal"  Location: Right knee      OBJECTIVE     Observation 3/30/2022: Patient ambulates into clinic with no AD and demonstrates decreased heel contact and full knee extension on right knee, decreased hip and knee flexion, and slight lateral lean to the left. Patient reports posterior knee pain with knee flexion around 30 degrees which is posterior joint line around hamstring attachment.     Right Knee Range of Motion:   AROM PROM   Flexion 125 degrees  130 degrees    Extension 8 degrees of flexion 4 degrees of flexion    *Following manual therapy intervention able to actively achieve 5 degrees " "knee extension      Treatment     Tomás received the treatments listed below:      Tomás received therapeutic exercises to develop strength, endurance and ROM for 30 minutes including:    Assessment as above   Heel Prop x5 min with 5# above and below   Heel Prop with longsitting HS stretch with strap 5x15 sec holds   Shuttle 2 up and 1 down eccentric quad 3x6-8 reps with 125#  Shuttle Sidelying 2x10 reps with 50#  Hamstring curls 5# 3x10  LAQ 5# 3x10   Head of mat elevated AAROM knee bend with 5# weight x3 min     Not Today:  Slantboard 3x30  Half Kneeling on airex to floor 3x30  Double LE kneeling 3x30  Lateral heel taps using 6" with emphasis on knee extension, 3 x 10  +SL sit<>stand from elevated hi-lo table, using pole in L UE for balance assist 3x8  double leg shuttle press 100#, 3 x 10   SL shuttle leg press, 75#, 3 x 10   Seated R knee flexion with manual resistance 10 x 3  Prone hang   Leg press 60# x30  Bridges staggared standce 10 x 3  upright bike L3 x6mins    manual therapy techniques: Soft tissue Mobilization were applied to the: right knee for 18 minutes, including:    Patella mobilizations grade III-IV  Knee extension overpressure   Knee Extension hinge mobilizations with ER grade III-IV  Seated edge of mat knee distraction with anterior to posterior tibial glides   Knee flexion with IR     Not Today:  Tack and hold to R quad into knee flexion   IASTM to the R hamstring, calf and posterior R knee in prone  R Patella and knee passive mobilization  R patella tendon soft tissue mobilization      Patient Education and Home Exercises     Home Exercises Provided and Patient Education Provided     Education provided:   - HEP  - proper heel to toe gait     Written Home Exercises Provided: Patient instructed to cont prior HEP. Exercises were reviewed and Tomás was able to demonstrate them prior to the end of the session.  Tomás demonstrated good  understanding of the education provided. See EMR under " Patient Instructions for exercises provided during therapy sessions  ASSESSMENT     Tomás tolerated physical therapy session fairly well. He continues to present with decreased knee range of motion in full knee extension. He responds well to manual therapy intervention however still unable to gain full knee extension during session. He reports pain with knee flexion in his posterior knee which occurs around 30 degrees of knee flexion before improving. His knee flexion range of motion was able to achieve 125 degrees actively and by end of session 130 degrees. He was progressed with some therex today and educated at home to continue with HEP with emphasis on quad and hip strength to help with his function and the importance of full knee extension. His gait continues to be abnormal and limited secondary to his lack of full knee extension. Will continue to monitor and progress as able.     Tomás is progressing well towards his goals.   Pt prognosis is Good.     Pt will continue to benefit from skilled outpatient physical therapy to address the deficits listed in the problem list box on initial evaluation, provide pt/family education and to maximize pt's level of independence in the home and community environment.     Pt's spiritual, cultural and educational needs considered and pt agreeable to plan of care and goals.     Anticipated Barriers for therapy: history of knee problems.     Goals:  Short Term Goals (6 Weeks):   1. Pt will be compliant with HEP to supplement PT in restoring pain free function. (MET)  2. Pt will report pain less than or equal to 4/10 during activity to display improved functional ability. (MET)  3. Pt improve impaired R knee ROM to 0-95 deg to improve mobility for normal movement patterns.  (progressing)    Long Term Goals (12 Weeks):  1. Pt will improve FOTO score to </= 29% limited to decrease perceived limitation with mobility. (progressing)  2. Pt will report pain less than or equal to  1/10 during activity to display improved functional ability. (progressing)  3. Pt improve impaired R knee ROM to 0-120 deg to improve mobility for normal movement patterns. (progressing)  4. Pt will be able to ambulate independently without an AD to demonstrated improved function. (MET)    PLAN   Plan of care Certification: 3/15/2022 - 4/15/2022    Continue with current plan of care with emphasis on knee range of motion, quad/hip strengthening, and gait mechanics.     Eva Gabriel PT, DPT

## 2022-04-01 ENCOUNTER — CLINICAL SUPPORT (OUTPATIENT)
Dept: REHABILITATION | Facility: HOSPITAL | Age: 47
End: 2022-04-01
Payer: MEDICAID

## 2022-04-01 DIAGNOSIS — R29.898 WEAKNESS OF RIGHT LOWER EXTREMITY: ICD-10-CM

## 2022-04-01 DIAGNOSIS — M25.661 DECREASED RANGE OF MOTION OF RIGHT KNEE: Primary | ICD-10-CM

## 2022-04-01 PROCEDURE — 97110 THERAPEUTIC EXERCISES: CPT | Mod: PO,CQ

## 2022-04-01 NOTE — PROGRESS NOTES
"[facility]  Physical Therapy Treatment Note    Name: Tomás Ventura  Clinic Number: [id number]    Therapy Diagnosis:   Encounter Diagnoses   Name Primary?    Decreased range of motion of right knee Yes    Weakness of right lower extremity      Physician: Gregory Fitzpatrick MD     Visit Date: 4/1/2022    Physician Orders: PT Eval and Treat   Medical Diagnosis from Referral: M17.11 (ICD-10-CM) - Osteoarthritis of right knee  Evaluation Date: 11/15/2021  Authorization Period Expiration: 05/18/2022  Updated Plan of Care Expiration: 4/15/2022  Visit # / Visits authorized: 18/14      Time In: 09:40 am  Time Out: 10:30 am  Total Billable Time:  50 minutes     Precautions: Standard and s/p R TKA on 11/11/2021    SUBJECTIVE     Pt reports: he still continue's to deal with R knee stiffness and has increased pain when standing for prolonged periods of time.   He was compliant with home exercise program.  Response to previous treatment: Sore   Functional change: Ongoing    Pain: 2-3/10 "minimal"  Location: Right knee      OBJECTIVE     Observation 3/30/2022: Patient ambulates into clinic with no AD and demonstrates decreased heel contact and full knee extension on right knee, decreased hip and knee flexion, and slight lateral lean to the left. Patient reports posterior knee pain with knee flexion around 30 degrees which is posterior joint line around hamstring attachment.     Right Knee Range of Motion:   AROM PROM   Flexion 125 degrees  130 degrees    Extension 8 degrees of flexion 4 degrees of flexion    *Following manual therapy intervention able to actively achieve 5 degrees knee extension      Treatment     Tomás received the treatments listed below:      Tomás received therapeutic exercises to develop strength, endurance and ROM for 30 minutes including:    Assessment as above   Heel Prop x5 min with 5# above and below   Heel Prop with longsitting HS stretch with strap 5x15 sec holds   Prone " "hang  3'  Shuttle 2 up and 1 down eccentric quad 3x6-8 reps with 125#  Shuttle Sidelying 2x10 reps with 50#  Hamstring curls 5# 3x10  LAQ 5# 3x10   Head of mat elevated AAROM knee bend with 5# weight x3 min     Not Today:  Slantboard 3x30  Half Kneeling on airex to floor 3x30  Double LE kneeling 3x30  Lateral heel taps using 6" with emphasis on knee extension, 3 x 10  +SL sit<>stand from elevated hi-lo table, using pole in L UE for balance assist 3x8  double leg shuttle press 100#, 3 x 10   SL shuttle leg press, 75#, 3 x 10   Seated R knee flexion with manual resistance 10 x 3  Leg press 60# x30  Bridges staggared standce 10 x 3  upright bike L3 x6mins    manual therapy techniques: Soft tissue Mobilization were applied to the: right knee for 15 minutes, including:     Patella mobilizations grade III-IV  Knee extension overpressure   Knee Extension hinge mobilizations with ER grade III-IV  Seated edge of mat knee distraction with anterior to posterior tibial glides   Knee flexion with IR   IASTM to the R hamstring, calf and posterior R knee in prone    Not Today:  Tack and hold to R quad into knee flexion   R Patella and knee passive mobilization  R patella tendon soft tissue mobilization      Patient Education and Home Exercises     Home Exercises Provided and Patient Education Provided     Education provided:   - HEP  - proper heel to toe gait     Written Home Exercises Provided: Patient instructed to cont prior HEP. Exercises were reviewed and Tomás was able to demonstrate them prior to the end of the session.  Tomás demonstrated good  understanding of the education provided. See EMR under Patient Instructions for exercises provided during therapy sessions  ASSESSMENT     Tomás performed the above exercises with good tolerance but experienced discomfort with manual overpressure knee ext PROM stretching.  Will continue to monitor and progress pt within his tolerance.     Tomás is progressing well towards his " goals.   Pt prognosis is Good.     Pt will continue to benefit from skilled outpatient physical therapy to address the deficits listed in the problem list box on initial evaluation, provide pt/family education and to maximize pt's level of independence in the home and community environment.     Pt's spiritual, cultural and educational needs considered and pt agreeable to plan of care and goals.     Anticipated Barriers for therapy: history of knee problems.     Goals:  Short Term Goals (6 Weeks):   1. Pt will be compliant with HEP to supplement PT in restoring pain free function. (MET)  2. Pt will report pain less than or equal to 4/10 during activity to display improved functional ability. (MET)  3. Pt improve impaired R knee ROM to 0-95 deg to improve mobility for normal movement patterns.  (progressing)    Long Term Goals (12 Weeks):  1. Pt will improve FOTO score to </= 29% limited to decrease perceived limitation with mobility. (progressing)  2. Pt will report pain less than or equal to 1/10 during activity to display improved functional ability. (progressing)  3. Pt improve impaired R knee ROM to 0-120 deg to improve mobility for normal movement patterns. (progressing)  4. Pt will be able to ambulate independently without an AD to demonstrated improved function. (MET)    PLAN   Plan of care Certification: 3/15/2022 - 4/15/2022    Continue with current plan of care with emphasis on knee range of motion, quad/hip strengthening, and gait mechanics.     Jose Wright, PTA

## 2022-04-04 NOTE — PROGRESS NOTES
"[facility]  Physical Therapy Treatment Note    Name: Tomás Ventura  Clinic Number: [id number]    Therapy Diagnosis:   Encounter Diagnoses   Name Primary?    Decreased range of motion of right knee Yes    Weakness of right lower extremity      Physician: Gregory Fitzpatrick MD     Visit Date: 4/5/2022    Physician Orders: PT Eval and Treat   Medical Diagnosis from Referral: M17.11 (ICD-10-CM) - Osteoarthritis of right knee  Evaluation Date: 11/15/2021  Authorization Period Expiration: 05/18/2022  Updated Plan of Care Expiration: 4/15/2022  Visit # / Visits authorized: 19/14      Time In: 902am  Time Out: 945am  Total Billable Time:  43 minutes     Precautions: Standard and s/p R TKA on 11/11/2021    SUBJECTIVE     Pt reports: that he was able to walk about 10 miles on Sunday without too much pain. He has his most pain at this time when transitioning from extension to flexion.  He was compliant with home exercise program.  Response to previous treatment: Sore   Functional change: Ongoing    Pain: 2-3/10 "minimal"  Location: Right knee      OBJECTIVE     No objective measures taken today.    Treatment     Tomás received the treatments listed below:      Tomás received therapeutic exercises to develop strength, endurance and ROM for 30 minutes including:    Heel Prop on stool x6 min with 10# above and below   Heel Prop with longsitting HS stretch with strap 5x15 sec holds   DL shuttle leg press 100#, 3 x 10  SL shuttle leg press, 75#, 3 x 8  LAQ on the machine with 5# 3x10   Heel slides with strap for overpressure, 2 x 10   Step ups on 6" step, 3 x 8 on the R  Bridges, 3 x 10  SL hip abduction, 3 x 8 on the R  Squats at 20" box holding 10# KB, 3 x 8  Lateral walking with GTB around shins with 10# KB press, 2 turf laps    Not Today:  Hamstring curls 5# 3x10  Prone hang  3'  Slantboard 3x30  Half Kneeling on airex to floor 3x30  Double LE kneeling 3x30  Lateral heel taps using 6" with " emphasis on knee extension, 3 x 10  +SL sit<>stand from elevated hi-lo table, using pole in L UE for balance assist 3x8  upright bike L3 x6mins    manual therapy techniques: Soft tissue Mobilization were applied to the: right knee for 0 minutes, including:     Patella mobilizations grade III-IV  Knee extension overpressure   Knee Extension hinge mobilizations with ER grade III-IV  Seated edge of mat knee distraction with anterior to posterior tibial glides   Knee flexion with IR   IASTM to the R hamstring, calf and posterior R knee in prone    Not Today:  Tack and hold to R quad into knee flexion   R Patella and knee passive mobilization  R patella tendon soft tissue mobilization      Patient Education and Home Exercises     Home Exercises Provided and Patient Education Provided     Education provided:   - HEP  - proper heel to toe gait     Written Home Exercises Provided: Patient instructed to cont prior HEP. Exercises were reviewed and Tomás was able to demonstrate them prior to the end of the session.  Tomás demonstrated good  understanding of the education provided. See EMR under Patient Instructions for exercises provided during therapy sessions  ASSESSMENT     Pt remains with extension ROM limitations. He continues to be pushed into extension but has not progressed much over the past few weeks. His flexion is improved since his manipulation. He struggles with the transition from extension to flexion due to an increase in pain. His flexion ROM was measured at 125 degrees during heel slides today.     Note: the pt was educated by the PT today that he has no more remaining visits on his current referral and that insurance has not approved him at this time. He stated that he would like to continue with his remaining scheduled visits and understands that he will be financially responsible for these visits.     Tomás is progressing well towards his goals.   Pt prognosis is Good.     Pt will continue to benefit  from skilled outpatient physical therapy to address the deficits listed in the problem list box on initial evaluation, provide pt/family education and to maximize pt's level of independence in the home and community environment.     Pt's spiritual, cultural and educational needs considered and pt agreeable to plan of care and goals.     Anticipated Barriers for therapy: history of knee problems.     Goals:  Short Term Goals (6 Weeks):   1. Pt will be compliant with HEP to supplement PT in restoring pain free function. (MET)  2. Pt will report pain less than or equal to 4/10 during activity to display improved functional ability. (MET)  3. Pt improve impaired R knee ROM to 0-95 deg to improve mobility for normal movement patterns.  (progressing)    Long Term Goals (12 Weeks):  1. Pt will improve FOTO score to </= 29% limited to decrease perceived limitation with mobility. (progressing)  2. Pt will report pain less than or equal to 1/10 during activity to display improved functional ability. (progressing)  3. Pt improve impaired R knee ROM to 0-120 deg to improve mobility for normal movement patterns. (progressing)  4. Pt will be able to ambulate independently without an AD to demonstrated improved function. (MET)    PLAN   Plan of care Certification: 3/15/2022 - 4/15/2022    Continue with current plan of care with emphasis on knee range of motion, quad/hip strengthening, and gait mechanics.     KAIT BANEGAS, PT

## 2022-04-05 ENCOUNTER — CLINICAL SUPPORT (OUTPATIENT)
Dept: REHABILITATION | Facility: HOSPITAL | Age: 47
End: 2022-04-05
Payer: MEDICAID

## 2022-04-05 DIAGNOSIS — R29.898 WEAKNESS OF RIGHT LOWER EXTREMITY: ICD-10-CM

## 2022-04-05 DIAGNOSIS — M25.661 DECREASED RANGE OF MOTION OF RIGHT KNEE: Primary | ICD-10-CM

## 2022-04-05 PROCEDURE — 97110 THERAPEUTIC EXERCISES: CPT | Mod: PO

## 2022-04-06 NOTE — PROGRESS NOTES
"[facility]  Physical Therapy Treatment Note    Name: Tomás Ventura  Clinic Number: [id number]    Therapy Diagnosis:   Encounter Diagnoses   Name Primary?    Decreased range of motion of right knee Yes    Weakness of right lower extremity      Physician: Gregory Fitzpatrick MD     Visit Date: 4/7/2022    Physician Orders: PT Eval and Treat   Medical Diagnosis from Referral: M17.11 (ICD-10-CM) - Osteoarthritis of right knee  Evaluation Date: 11/15/2021  Authorization Period Expiration: 05/18/2022  Updated Plan of Care Expiration: 4/15/2022  Visit # / Visits authorized: 20/14      Time In: 1006am  Time Out: 1051am  Total Billable Time:  45 minutes     Precautions: Standard and s/p R TKA on 11/11/2021    SUBJECTIVE     Pt reports: he continues to walk about 10 miles per day. He is exercising a lot throughout the day to improve his knee strength and mobility.   He was compliant with home exercise program.  Response to previous treatment: Sore   Functional change: Ongoing    Pain: 2-3/10 "minimal"  Location: Right knee      OBJECTIVE     No objective measures taken today.    Treatment     Tomás received the treatments listed below:      Tomás received therapeutic exercises to develop strength, endurance and ROM for 45 minutes including:    Heel Prop on stool x6 min with 10# above and below   Seated edge of mat HS stretch, 4 x 30"   DL shuttle leg press 100#, 3 x 10  SL shuttle leg press, 75#, 3 x 8  LAQ on the machine with 5# 3x10   Heel slides with strap for overpressure, 2 x 10   Step ups on 8" step holding 15# KB, 3 x 8 on the R  Squats at 20" box holding 25# KB, 3 x 10  Lateral walking with GTB around shins with 10# KB press, 2 turf laps  Retro walking using cable column with 27#, 3 x 6 there and back  SL RDLs on the R (using bolster between L hand and foot for form), 2 x 8  Lunges down the turf, 3 laps there and back    Not Today:  Bridges, 3 x 10  SL hip abduction, 3 x 8 on the " "R  Hamstring curls 5# 3x10  Prone hang  3'  Slantboard 3x30  Half Kneeling on airex to floor 3x30  Double LE kneeling 3x30  Lateral heel taps using 6" with emphasis on knee extension, 3 x 10  +SL sit<>stand from elevated hi-lo table, using pole in L UE for balance assist 3x8  upright bike L3 x6mins    manual therapy techniques: Soft tissue Mobilization were applied to the: right knee for 0 minutes, including:     Patella mobilizations grade III-IV  Knee extension overpressure   Knee Extension hinge mobilizations with ER grade III-IV  Seated edge of mat knee distraction with anterior to posterior tibial glides   Knee flexion with IR   IASTM to the R hamstring, calf and posterior R knee in prone    Not Today:  Tack and hold to R quad into knee flexion   R Patella and knee passive mobilization  R patella tendon soft tissue mobilization      Patient Education and Home Exercises     Home Exercises Provided and Patient Education Provided     Education provided:   - HEP  - proper heel to toe gait     Written Home Exercises Provided: Patient instructed to cont prior HEP. Exercises were reviewed and Tomás was able to demonstrate them prior to the end of the session.  Tomás demonstrated good  understanding of the education provided. See EMR under Patient Instructions for exercises provided during therapy sessions  ASSESSMENT     Pt continues with knee extension deficits that have not progressed much over the past few weeks. His knee flexion was measured at 125 degrees today. He continues to be challenged with higher level quad and hip strengthening and functional based exercises. He is educated to continue exercising on a daily basis to maximize his strength and mobility.     Tomás is progressing well towards his goals.   Pt prognosis is Good.     Pt will continue to benefit from skilled outpatient physical therapy to address the deficits listed in the problem list box on initial evaluation, provide pt/family education " and to maximize pt's level of independence in the home and community environment.     Pt's spiritual, cultural and educational needs considered and pt agreeable to plan of care and goals.     Anticipated Barriers for therapy: history of knee problems.     Goals:  Short Term Goals (6 Weeks):   1. Pt will be compliant with HEP to supplement PT in restoring pain free function. (MET)  2. Pt will report pain less than or equal to 4/10 during activity to display improved functional ability. (MET)  3. Pt improve impaired R knee ROM to 0-95 deg to improve mobility for normal movement patterns.  (progressing)    Long Term Goals (12 Weeks):  1. Pt will improve FOTO score to </= 29% limited to decrease perceived limitation with mobility. (progressing)  2. Pt will report pain less than or equal to 1/10 during activity to display improved functional ability. (progressing)  3. Pt improve impaired R knee ROM to 0-120 deg to improve mobility for normal movement patterns. (progressing)  4. Pt will be able to ambulate independently without an AD to demonstrated improved function. (MET)    PLAN   Plan of care Certification: 3/15/2022 - 4/15/2022    Continue with current plan of care with emphasis on knee range of motion, quad/hip strengthening, and gait mechanics.     KAIT BANEGAS, PT

## 2022-04-07 ENCOUNTER — CLINICAL SUPPORT (OUTPATIENT)
Dept: REHABILITATION | Facility: HOSPITAL | Age: 47
End: 2022-04-07
Payer: MEDICAID

## 2022-04-07 DIAGNOSIS — M25.661 DECREASED RANGE OF MOTION OF RIGHT KNEE: Primary | ICD-10-CM

## 2022-04-07 DIAGNOSIS — R29.898 WEAKNESS OF RIGHT LOWER EXTREMITY: ICD-10-CM

## 2022-04-07 PROCEDURE — 97110 THERAPEUTIC EXERCISES: CPT | Mod: PO

## 2022-04-11 ENCOUNTER — CLINICAL SUPPORT (OUTPATIENT)
Dept: REHABILITATION | Facility: HOSPITAL | Age: 47
End: 2022-04-11
Payer: MEDICAID

## 2022-04-11 DIAGNOSIS — R29.898 WEAKNESS OF RIGHT LOWER EXTREMITY: ICD-10-CM

## 2022-04-11 DIAGNOSIS — M25.661 DECREASED RANGE OF MOTION OF RIGHT KNEE: Primary | ICD-10-CM

## 2022-04-11 PROCEDURE — 97110 THERAPEUTIC EXERCISES: CPT | Mod: PO

## 2022-04-11 NOTE — PROGRESS NOTES
"[facility]  Physical Therapy Treatment Note and Discharge Note    Name: Tomás Ventura  Clinic Number: [id number]    Therapy Diagnosis:   Encounter Diagnoses   Name Primary?    Decreased range of motion of right knee Yes    Weakness of right lower extremity      Physician: Gregory Fitzpatrick MD     Visit Date: 4/11/2022  Physician Orders: PT Eval and Treat   Medical Diagnosis from Referral: M17.11 (ICD-10-CM) - Osteoarthritis of right knee  Evaluation Date: 11/15/2021  Authorization Period Expiration: 05/18/2022  Updated Plan of Care Expiration: 4/15/2022  Visit # / Visits authorized: 21/14      Time In: 9:20 am  Time Out: 10:00 am  Total Billable Time:  40 minutes   Precautions: Standard and s/p R TKA on 11/11/2021  SUBJECTIVE     Pt reports: he has already walked for ~4 miles this morning. He still continues to feel that he can get more (or less range of motion) with certain positions and with passive assistance. He notes that as per plan of care of primary PT, Dr. Weller, today will be his final visit, which patient is in agreement with.  He was compliant with home exercise program.  Response to previous treatment: Sore   Functional change: Ongoing    Pain: 2-3/10 "minimal"  Location: Right knee   OBJECTIVE      Right   Knee Flexion 115 degrees (prior to treatment)    Knee Extension -7 degrees (prior to treatment)     Treatment     Tomás received the treatments listed below:     Tomás received therapeutic exercises to develop strength, endurance and ROM for 45 minutes including:  Heel Prop on Chair x6 min with 20# at knee joint line  Seated HS Stretch, 4 x 30"   Standing Knee Flexion Stretch on 8" step, 10x10" hold  Step Ups on 8" step holding 20# KB, 3 x 8 on the (R)  Squats at 20" box holding 25# KB, 3x10  (progressing to weight bench for slightly lower surface)  Lateral walking with GTB around shins with 10# KB press, 2 turf laps  Lunges on Turf, 15x laps there and " back    Patient Education and Home Exercises     Home Exercises Provided and Patient Education Provided     Education provided:   - HEP  - proper heel to toe gait  -Patient inquired as to the benefit of running to aid in knee extension. Education was provided to patient that running may not be the best form of exercise due to young age and prosthesis wear with this, as well as due to lack of full knee extension and quadriceps contraction to provide shock absorption.     Written Home Exercises Provided: Patient instructed to cont prior HEP. Exercises were reviewed and Tomás was able to demonstrate them prior to the end of the session.  Tomás demonstrated good  understanding of the education provided. See EMR under Patient Instructions for exercises provided during therapy sessions  ASSESSMENT   Pt continues with knee extension deficits that have not progressed much over the past few weeks, arriving to clinic at -7 degrees. His knee flexion was measured at 115 degrees today prior to therapy. Patient inquired as to the benefit of running to aid in knee extension. Education was provided to patient that running may not be the best form of exercise due to young age and prosthesis wear with this, as well as due to lack of full knee extension and quadriceps contraction to provide shock absorption. At this time, he has likely made as much progress with skilled PT services as possible and will continue to work on his deficits at home. He was provided a comprehensive home exercise plan of care, which was reviewed with the patient and written handout was provided.    Tomás is progressing well towards his goals.   Pt prognosis is Good.     Pt will continue to benefit from skilled outpatient physical therapy to address the deficits listed in the problem list box on initial evaluation, provide pt/family education and to maximize pt's level of independence in the home and community environment.     Pt's spiritual, cultural and  educational needs considered and pt agreeable to plan of care and goals.     Anticipated Barriers for therapy: history of knee problems.     Goals:  Short Term Goals (6 Weeks):   1. Pt will be compliant with HEP to supplement PT in restoring pain free function. (MET)  2. Pt will report pain less than or equal to 4/10 during activity to display improved functional ability. (MET)  3. Pt improve impaired R knee ROM to 0-95 deg to improve mobility for normal movement patterns.   (Not met for extension range of motion, met for flexion range of motion)    Long Term Goals (12 Weeks):  1. Pt will improve FOTO score to </= 29% limited to decrease perceived limitation with mobility. (progressing)  2. Pt will report pain less than or equal to 1/10 during activity to display improved functional ability. (MET)  3. Pt improve impaired R knee ROM to 0-120 deg to improve mobility for normal movement patterns.  (Not met for extension or flexion range of motion)  4. Pt will be able to ambulate independently without an AD to demonstrated improved function. (MET)    PLAN   Plan of care Certification: 3/15/2022 - 4/15/2022  Discharge from skilled therapy services with comprehensive home exercise program. Patient was encouraged to reach out to the clinic with any further questions or needs.    Ann Peter PT, DPT, OCS  License Number: [id number]

## 2022-04-11 NOTE — PLAN OF CARE
"[facility]  Physical Therapy Treatment Note and Discharge Note    Name: Tomás Ventura  Clinic Number: [id number]    Therapy Diagnosis:   Encounter Diagnoses   Name Primary?    Decreased range of motion of right knee Yes    Weakness of right lower extremity      Physician: Gregory Fitzpatrick MD     Visit Date: 4/11/2022  Physician Orders: PT Eval and Treat   Medical Diagnosis from Referral: M17.11 (ICD-10-CM) - Osteoarthritis of right knee  Evaluation Date: 11/15/2021  Authorization Period Expiration: 05/18/2022  Updated Plan of Care Expiration: 4/15/2022  Visit # / Visits authorized: 21/14      Time In: 9:20 am  Time Out: 10:00 am  Total Billable Time:  40 minutes   Precautions: Standard and s/p R TKA on 11/11/2021  SUBJECTIVE     Pt reports: he has already walked for ~4 miles this morning. He still continues to feel that he can get more (or less range of motion) with certain positions and with passive assistance. He notes that as per plan of care of primary PT, Dr. Weller, today will be his final visit, which patient is in agreement with.  He was compliant with home exercise program.  Response to previous treatment: Sore   Functional change: Ongoing    Pain: 2-3/10 "minimal"  Location: Right knee   OBJECTIVE      Right   Knee Flexion 115 degrees (prior to treatment)    Knee Extension -7 degrees (prior to treatment)     Treatment     Tomás received the treatments listed below:     Tomás received therapeutic exercises to develop strength, endurance and ROM for 45 minutes including:  Heel Prop on Chair x6 min with 20# at knee joint line  Seated HS Stretch, 4 x 30"   Standing Knee Flexion Stretch on 8" step, 10x10" hold  Step Ups on 8" step holding 20# KB, 3 x 8 on the (R)  Squats at 20" box holding 25# KB, 3x10  (progressing to weight bench for slightly lower surface)  Lateral walking with GTB around shins with 10# KB press, 2 turf laps  Lunges on Turf, 15x laps there and " back    Patient Education and Home Exercises     Home Exercises Provided and Patient Education Provided     Education provided:   - HEP  - proper heel to toe gait  -Patient inquired as to the benefit of running to aid in knee extension. Education was provided to patient that running may not be the best form of exercise due to young age and prosthesis wear with this, as well as due to lack of full knee extension and quadriceps contraction to provide shock absorption.     Written Home Exercises Provided: Patient instructed to cont prior HEP. Exercises were reviewed and Tomás was able to demonstrate them prior to the end of the session.  Tomás demonstrated good  understanding of the education provided. See EMR under Patient Instructions for exercises provided during therapy sessions  ASSESSMENT   Pt continues with knee extension deficits that have not progressed much over the past few weeks, arriving to clinic at -7 degrees. His knee flexion was measured at 115 degrees today prior to therapy. Patient inquired as to the benefit of running to aid in knee extension. Education was provided to patient that running may not be the best form of exercise due to young age and prosthesis wear with this, as well as due to lack of full knee extension and quadriceps contraction to provide shock absorption. He was provided a comprehensive home exercise plan of care, which was reviewed with the patient and written handout was provided.    Tomás is progressing well towards his goals.   Pt prognosis is Good.     Pt will continue to benefit from skilled outpatient physical therapy to address the deficits listed in the problem list box on initial evaluation, provide pt/family education and to maximize pt's level of independence in the home and community environment.     Pt's spiritual, cultural and educational needs considered and pt agreeable to plan of care and goals.     Anticipated Barriers for therapy: history of knee problems.      Goals:  Short Term Goals (6 Weeks):   1. Pt will be compliant with HEP to supplement PT in restoring pain free function. (MET)  2. Pt will report pain less than or equal to 4/10 during activity to display improved functional ability. (MET)  3. Pt improve impaired R knee ROM to 0-95 deg to improve mobility for normal movement patterns.   (Not met for extension range of motion, met for flexion range of motion)    Long Term Goals (12 Weeks):  1. Pt will improve FOTO score to </= 29% limited to decrease perceived limitation with mobility. (progressing)  2. Pt will report pain less than or equal to 1/10 during activity to display improved functional ability. (MET)  3. Pt improve impaired R knee ROM to 0-120 deg to improve mobility for normal movement patterns.  (Not met for extension or flexion range of motion)  4. Pt will be able to ambulate independently without an AD to demonstrated improved function. (MET)    PLAN   Plan of care Certification: 3/15/2022 - 4/15/2022  Discharge from skilled therapy services with comprehensive home exercise program. Patient was encouraged to reach out to the clinic with any further questions or needs.    Ann Peter PT, DPT, OCS  License Number: [id number]

## 2022-04-19 ENCOUNTER — PATIENT MESSAGE (OUTPATIENT)
Dept: REHABILITATION | Facility: HOSPITAL | Age: 47
End: 2022-04-19
Payer: MEDICAID